# Patient Record
Sex: FEMALE | Race: WHITE | Employment: OTHER | URBAN - METROPOLITAN AREA
[De-identification: names, ages, dates, MRNs, and addresses within clinical notes are randomized per-mention and may not be internally consistent; named-entity substitution may affect disease eponyms.]

---

## 2017-02-24 ENCOUNTER — HOSPITAL ENCOUNTER (OUTPATIENT)
Dept: LAB | Age: 72
Discharge: HOME | End: 2017-02-24

## 2017-02-24 LAB
DIFFERENTIAL METHOD BLD: ABNORMAL
ERYTHROCYTE [DISTWIDTH] IN BLOOD BY AUTOMATED COUNT: 15.4 % (ref 11.9–14.6)
HCT VFR BLD AUTO: 30.9 % (ref 35.8–46.3)
HGB BLD-MCNC: 10.4 G/DL (ref 11.7–15.4)
LYMPHOCYTES # BLD: 3 K/UL (ref 0.5–4.6)
LYMPHOCYTES NFR BLD MANUAL: 18 % (ref 16–44)
MCH RBC QN AUTO: 32.1 PG (ref 26.1–32.9)
MCHC RBC AUTO-ENTMCNC: 33.7 G/DL (ref 31.4–35)
MCV RBC AUTO: 95.4 FL (ref 79.6–97.8)
METAMYELOCYTES NFR BLD MANUAL: 5 %
MONOCYTES # BLD: 0.3 K/UL (ref 0.1–1.3)
MONOCYTES NFR BLD MANUAL: 2 % (ref 3–9)
MYELOCYTES NFR BLD MANUAL: 4 %
NEUTS BAND NFR BLD MANUAL: 6 % (ref 0–10)
NEUTS SEG # BLD: 13.6 K/UL (ref 1.7–8.2)
NEUTS SEG NFR BLD MANUAL: 63 % (ref 47–75)
NRBC BLD-RTO: 2 PER 100 WBC
PLATELET # BLD AUTO: 325 K/UL (ref 150–450)
PLATELET COMMENTS,PCOM: ADEQUATE
PMV BLD AUTO: 9 FL (ref 10.8–14.1)
PROMYELOCYTES NFR BLD MANUAL: 2 %
RBC # BLD AUTO: 3.24 M/UL (ref 4.05–5.25)
RBC MORPH BLD: ABNORMAL
RBC MORPH BLD: ABNORMAL
WBC # BLD AUTO: 16.9 K/UL (ref 4.3–11.1)
WBC MORPH BLD: ABNORMAL

## 2017-02-24 PROCEDURE — 85025 COMPLETE CBC W/AUTO DIFF WBC: CPT | Performed by: FAMILY MEDICINE

## 2017-02-27 ENCOUNTER — HOSPITAL ENCOUNTER (OUTPATIENT)
Dept: LAB | Age: 72
Discharge: HOME | End: 2017-02-27

## 2017-02-27 LAB
APPEARANCE UR: ABNORMAL
BACTERIA URNS QL MICRO: 0 /HPF
BASOPHILS # BLD AUTO: 0 K/UL (ref 0–0.2)
BASOPHILS # BLD: 0 % (ref 0–2)
BILIRUB UR QL: NEGATIVE
CASTS URNS QL MICRO: ABNORMAL /LPF
COLOR UR: YELLOW
DIFFERENTIAL METHOD BLD: ABNORMAL
EOSINOPHIL # BLD: 0.3 K/UL (ref 0–0.8)
EOSINOPHIL NFR BLD: 3 % (ref 0.5–7.8)
EPI CELLS #/AREA URNS HPF: ABNORMAL /HPF
ERYTHROCYTE [DISTWIDTH] IN BLOOD BY AUTOMATED COUNT: 15.3 % (ref 11.9–14.6)
GLUCOSE UR STRIP.AUTO-MCNC: NEGATIVE MG/DL
HCT VFR BLD AUTO: 28.7 % (ref 35.8–46.3)
HGB BLD-MCNC: 9.5 G/DL (ref 11.7–15.4)
HGB UR QL STRIP: NEGATIVE
IMM GRANULOCYTES # BLD: 0.2 K/UL (ref 0–0.5)
IMM GRANULOCYTES NFR BLD AUTO: 2.3 % (ref 0–5)
KETONES UR QL STRIP.AUTO: NEGATIVE MG/DL
LEUKOCYTE ESTERASE UR QL STRIP.AUTO: ABNORMAL
LYMPHOCYTES # BLD AUTO: 27 % (ref 13–44)
LYMPHOCYTES # BLD: 2.5 K/UL (ref 0.5–4.6)
MCH RBC QN AUTO: 31.6 PG (ref 26.1–32.9)
MCHC RBC AUTO-ENTMCNC: 33.1 G/DL (ref 31.4–35)
MCV RBC AUTO: 95.3 FL (ref 79.6–97.8)
MONOCYTES # BLD: 0.7 K/UL (ref 0.1–1.3)
MONOCYTES NFR BLD AUTO: 8 % (ref 4–12)
NEUTS SEG # BLD: 5.5 K/UL (ref 1.7–8.2)
NEUTS SEG NFR BLD AUTO: 60 % (ref 43–78)
NITRITE UR QL STRIP.AUTO: NEGATIVE
PH UR STRIP: 7 [PH] (ref 5–9)
PLATELET # BLD AUTO: 335 K/UL (ref 150–450)
PMV BLD AUTO: 9.2 FL (ref 10.8–14.1)
PROT UR STRIP-MCNC: NEGATIVE MG/DL
RBC # BLD AUTO: 3.01 M/UL (ref 4.05–5.25)
RBC #/AREA URNS HPF: ABNORMAL /HPF
SP GR UR REFRACTOMETRY: 1.02 (ref 1–1.02)
UROBILINOGEN UR QL STRIP.AUTO: 1 EU/DL (ref 0.2–1)
WBC # BLD AUTO: 9.3 K/UL (ref 4.3–11.1)
WBC URNS QL MICRO: ABNORMAL /HPF

## 2017-02-27 PROCEDURE — 81001 URINALYSIS AUTO W/SCOPE: CPT | Performed by: FAMILY MEDICINE

## 2017-02-27 PROCEDURE — 85025 COMPLETE CBC W/AUTO DIFF WBC: CPT | Performed by: FAMILY MEDICINE

## 2018-05-14 ENCOUNTER — HOSPITAL ENCOUNTER (OUTPATIENT)
Dept: PHYSICAL THERAPY | Age: 73
Discharge: HOME | End: 2018-05-14
Payer: MEDICARE

## 2018-05-14 PROCEDURE — 97162 PT EVAL MOD COMPLEX 30 MIN: CPT

## 2018-05-14 PROCEDURE — G8979 MOBILITY GOAL STATUS: HCPCS

## 2018-05-14 PROCEDURE — G8978 MOBILITY CURRENT STATUS: HCPCS

## 2018-05-14 PROCEDURE — 97112 NEUROMUSCULAR REEDUCATION: CPT

## 2018-05-14 NOTE — PROGRESS NOTES
Ambulatory/Rehab Services H2 Model Falls Risk Assessment    Risk Factor Pts. ·   Confusion/Disorientation/Impulsivity  []    4 ·   Symptomatic Depression  [x]   2 ·   Altered Elimination  []   1 ·   Dizziness/Vertigo  [x]   1 ·   Gender (Male)  []   1 ·   Any administered antiepileptics (anticonvulsants):  [x]   2 ·   Any administered benzodiazepines:  []   1 ·   Visual Impairment (specify):  []   1 ·   Portable Oxygen Use  []   1 ·   Orthostatic ? BP  []   1 ·   History of Recent Falls (within 3 mos.)  []   5     Ability to Rise from Chair (choose one) Pts. ·   Ability to rise in a single movement  []   0 ·   Pushes up, successful in one attempt  [x]   1 ·   Multiple attempts, but successful  []   3 ·   Unable to rise without assistance  []   4   Total: (5 or greater = High Risk) 6     Falls Prevention Plan:   []                Physical Limitations to Exercise (specify):   []                Mobility Assistance Device (type):   [x]                Exercise/Equipment Adaptation (specify):perform SLS and other balance exercises  ©2010 [facility] [employer]. All Rights Reserved. [facility] Patent #[id number].  Federal Law prohibits the replication, distribution or use without written permission from [facility] [employer]

## 2018-05-15 NOTE — THERAPY EVALUATION
Jessica Dias  : 1945  [place] / Plan[place] / Product Type: Medicare /  [address]  at [facility]  [address] [place] [address], [facility] [address]  [phone]   [phone]         OUTPATIENT PHYSICAL THERAPY:Initial Assessment 2018    ICD-10: Treatment Diagnosis: difficulty walking R 26.2, muscle weakness  Unstable balance R 26.89    Precautions/Allergies:   Lactose   Fall Risk Score: 6 (? 5 = High Risk)  MD Orders: Eval and treat MEDICAL/REFERRING DIAGNOSIS:  Unstable balance [R26.89]  Weakness [R53.1]   DATE OF ONSET: at least a year or two  REFERRING PHYSICIAN: Roslyn Worthy MD  RETURN PHYSICIAN APPOINTMENT: as needed     INITIAL ASSESSMENT:  Ms. Kesha Shay presents with decreased balance and poor proprioceptive control. She reports she has had this problem for 1-2 years, but it has worsened. She is concerned about falling and reports she must look consistently at the floor in order to walk without falling or running into objects. She reports she often runs into objects in her home or while out. She has limited her time away from home due to fear of falling and lack of balance and coordination. She is not using an assistive device, but does have a cane and walker from prior knee replacement surgery. She is concerned as she has a trip in October to [place] and she will be walking on uneven surfaces. She has trekking poles that she would like to use, but feels uncoordinated with them. She reported her last fall was 2 years ago and she was evaluated for possible TIA/CVA  No signs were noted on imaging. Pt does have some resting tremors due to medication and she has a rather flat affect. She has not been referred to neurology. She denies any headaches, nausea, vomiting or vertigo. She does report turning her head to the right makes her feel as though she will fall.   She did have carotid testing 2 years ago with less than 50% occlusion. PROBLEM LIST (Impacting functional limitations):  1. Decreased Strength  2. Decreased ADL/Functional Activities  3. Decreased Ambulation Ability/Technique  4. Decreased Balance  5. Decreased Flexibility/Joint Mobility INTERVENTIONS PLANNED:  1. Balance Exercise  2. Gait Training  3. Home Exercise Program (HEP)  4. Manual Therapy  5. Range of Motion (ROM)  6. Therapeutic Exercise/Strengthening   TREATMENT PLAN:  Effective Dates: 5/14/2018 TO 8/12/2018 (90 days). Frequency/Duration: 2 times a week for 90 Days    GOALS: (Goals have been discussed and agreed upon with patient.)  Short-Term Functional Goals: Time Frame: 6 weeks  1. Establish independent HEP with no cueing. 2. Pt will be able to report zero falls. 3. Pt will be able to gain 1/2 grade increase in strength on left quads and HS in order to climb stairs with more ease. Discharge Goals: Time Frame: 12 weeks  1. Improve score on Perez balance by at least 5 points to show improvements in balance and to decrease her overall fall risk. 2. Pt will be able to stand with eyes closed for 5- 10 seconds with CGA to SBA. 3. Pt will be able to perform tandem stance with SBA for 10 seconds. 4. Pt will be able to walk on uneven terrain with use of trekking poles and demonstrate good dynamic balance. Rehabilitation Potential For Stated Goals: [facility], I certify that the treatment plan above will be carried out by a therapist or under their direction. Thank you for this referral,  Jayson Mallory, PT     Referring Physician Signature: Alexander Holloway MD              Date                    The information in this section was collected on 5/14/18 (except where otherwise noted). HISTORY:   History of Present Injury/Illness (Reason for Referral): INITIAL ASSESSMENT:  Ms. Niranjan Harris presents with decreased balance and poor proprioceptive control. She reports she has had this problem for 1-2 years, but it has worsened.  She is concerned about falling and reports she must look consistently at the floor in order to walk without falling or running into objects. She reports she often runs into objects in her home or while out. She has limited her time away from home due to fear of falling and lack of balance and coordination. She is not using an assistive device, but does have a cane and walker from prior knee replacement surgery. She is concerned as she has a trip in October to [place] and she will be walking on uneven surfaces. She has trekking poles that she would like to use, but feels uncoordinated with them. She reported her last fall was 2 years ago and she was evaluated for possible TIA/CVA  No signs were noted on imaging. Pt does have some resting tremors due to medication and she has a rather flat affect. She has not been referred to neurology. She denies any headaches, nausea, vomiting or vertigo. She does report turning her head to the right makes her feel as though she will fall. She did have carotid testing 2 years ago with less than 50% occlusion. Past Medical History/Comorbidities:   Ms. Diana Lee  has a past medical history of Arthritis; Autoimmune disease ([address] and Psychiatric disorder. She also has no past medical history of Aneurysm ([address] Arrhythmia; Asthma; CAD (coronary artery disease); Cancer ([address] Chronic kidney disease; Chronic obstructive pulmonary disease ([address] Coagulation disorder ([address] Diabetes ([address] Difficult intubation; GERD (gastroesophageal reflux disease); Heart failure ([address] Hypertension; Liver disease; Malignant hyperthermia due to anesthesia; Morbid obesity ([address] Nausea & vomiting; Pseudocholinesterase deficiency; PUD (peptic ulcer disease); Seizures ([address] Stroke [facility]); Thromboembolus ([address] Thyroid disease; or Unspecified sleep apnea.   Ms. Diana Lee  has a past surgical history that includes hx hysterectomy; hx tonsillectomy; hx adenoidectomy; hx knee arthroscopy (2013); hx breast biopsy (Left); and hx colostomy. Social History/Living Environment:     pt lives with spouse in a 2 level home. She reports more difficulty with descending the stairs than ascending them  Prior Level of Function/Work/Activity:  Pt is a retired . Retired in 2011  Dominant Side:         RIGHT  Current Medications:       Current Outpatient Prescriptions:     HYDROCODONE/ACETAMINOPHEN (NORCO PO), Take  by mouth., Disp: , Rfl:     colestipol (COLESTID) 1 gram tablet, Take 1 g by mouth two (2) times a day., Disp: , Rfl:     Venlafaxine 150 mg tr24, Take 1 Tab by mouth daily. , Disp: , Rfl:     divalproex DR (DEPAKOTE) 500 mg tablet, Take 500 mg by mouth daily. Per anesthesia protocol:instructed to take am of surgery. Indications: MIXED BIPOLAR I DISORDER, Disp: , Rfl:     ziprasidone (GEODON) 20 mg capsule, Take 20 mg by mouth three (3) times daily. Takes 2 capsules at bedtime per pt. Indications: MIXED BIPOLAR I DISORDER, Disp: , Rfl:     zolpidem (AMBIEN) 10 mg tablet, Take 10 mg by mouth nightly as needed. , Disp: , Rfl:    Date Last Reviewed:  5/14/2018     Number of Personal Factors/Comorbidities that affect the Plan of Care: 1-2: MODERATE COMPLEXITY   EXAMINATION:   Observation/Orthostatic Postural Assessment:          Pt with slight ataxic tendencies towards her gait. Very startled righting reactions with eyes closed testing as well as any head movements to the right  ROM:     ROM WFL                                       Strength:     R hip flex 5/5, quad 5/5, HS 5/5, glut med 4/5, ankle PF/DF 5/5         L hip flex 4/5, quad 4/5, HS 4/5, glut med 4+/5, ankle PF/DF 5/5            Special Tests: na  Neurological Screen: pt exhibits decreased coordination and proprioceptive control  Balance:  poor   Body Structures Involved:  1. Eyes and Ears  2. Bones  3. Joints  4. Muscles  5. Ligaments Body Functions Affected:  1. Neuromusculoskeletal  2.  Movement Related Activities and Participation Affected:  1. General Tasks and Demands  2. Mobility  3. Self Care  4. Domestic Life  5. Interpersonal Interactions and Relationships  6. Community, Social and [place]   Number of elements (examined above) that affect the Plan of Care: 3: MODERATE COMPLEXITY   CLINICAL PRESENTATION:   Presentation: Evolving clinical presentation with changing clinical characteristics: MODERATE COMPLEXITY   CLINICAL DECISION MAKING:   Outcome Measure: Tool Used: Perez Balance Scale  Score:  Initial: 36/56 Most Recent: X/56 (Date: -- )   Interpretation of Score: Each section is scored on a 0-4 scale, 0 representing the patients inability to perform the task and 4 representing independence. The scores of each section are added together for a total score of 56. The higher the patients score, the more independent the patient is. Any score below 45 indicates increased risk for falls. Score 56 55-45 44-34 33-23 22-12 11-1 0   Modifier CH CI CJ CK CL CM CN     ? Mobility - Walking and Moving Around:     - CURRENT STATUS: CJ - 20%-39% impaired, limited or restricted    - GOAL STATUS: CI - 1%-19% impaired, limited or restricted    - D/C STATUS:  ---------------To be determined---------------    Medical Necessity:   · Patient is expected to demonstrate progress in strength, balance and coordination to improve safety during ambulation and all transfers. Reason for Services/Other Comments:  · Patient continues to require skilled intervention due to decreased balance, coordination and proprioceptive control that has limited her ability to get ouf of the house due to fear of falling. In addition, pt is considered to be a high risk for falls.    Use of outcome tool(s) and clinical judgement create a POC that gives a: Questionable prediction of patient's progress: MODERATE COMPLEXITY               (In addition to Assessment/Re-Assessment sessions the following treatments were rendered)  Pre-treatment Symptoms/Complaints: Pt reporting no pain. She has a rather flat affect and poor balance with turning to the right, turning her head to the right or even static standing with eyes closed. Pain: Initial:   Pain Intensity 1: 0  Post Session:  0/10     THERAPEUTIC EXERCISE: (5 minutes):  Exercises per grid below to improve mobility and strength. Required minimal verbal cues to promote proper body mechanics. Progressed resistance, range and repetitions as indicated. NEUROMUSCULAR RE-EDUCATION: (15 minutes):  Exercise/activities per grid below to improve balance and coordination. Required minimal verbal and tactile cues to promote static and dynamic balance in standing. Ther ex:   LAQ x 20 bilaterally  Sit to stand x 5  Seated swiss ball with upright posture x 2 min  Seated on swiss ball with trunk rotation to the left x 10 4# weight    Neuromuscular re-education:  SLS bilaterally x 5 sec  airex marching x 20  airex tandem stance bilaterally x 20 sec  Evaluation (  xx   ):    Manual Therapy (   na   ):     Therapeutic Modalities:na    HEP: As above; handouts given to patient for all exercises. Bridging, LAQ      Treatment/Session Assessment:    · Response to Treatment:  Pt exhibiting poor balane, lack of coordination as well as poor proprioceptive control. She had a tendency to fall backwards with SLS and with dynamic standing and her eyes closed. She has increased difficulty with eyes closed as well as any hear or body turning to the right. She reports she must maintain eye contact with the ground to avoid falling. She has not had any recent falls, but reports some near misses. She would like to improve her balance and overall safety with ambulation. · Compliance with Program/Exercises: Will assess as treatment progresses. · Recommendations/Intent for next treatment session: \"Next visit will focus on advancements to more challenging activities\".   Total Treatment Duration: 60 min  PT Patient Time In/Time Out  Time In: 1200  Time Out: 0100  Treatment number 200 High Laura Jay, PT

## 2018-05-17 ENCOUNTER — HOSPITAL ENCOUNTER (OUTPATIENT)
Dept: PHYSICAL THERAPY | Age: 73
Discharge: HOME | End: 2018-05-17
Payer: MEDICARE

## 2018-05-17 PROCEDURE — 97112 NEUROMUSCULAR REEDUCATION: CPT

## 2018-05-17 PROCEDURE — 97110 THERAPEUTIC EXERCISES: CPT

## 2018-05-17 NOTE — PROGRESS NOTES
Albin Hoang  : 1945  [place] / [place] / Product Type: Medicare /  [address]  at [facility]  [address], [facility] [address]  [phone]   [phone]         OUTPATIENT PHYSICAL THERAPY:Daily Note 2018    ICD-10: Treatment Diagnosis: difficulty walking R 26.2, muscle weakness  Unstable balance R 26.89    Precautions/Allergies:   Lactose   Fall Risk Score: 6 (? 5 = High Risk)  MD Orders: Eval and treat MEDICAL/REFERRING DIAGNOSIS:  Unstable balance [R26.89]  Weakness [R53.1]   DATE OF ONSET: at least a year or two  REFERRING PHYSICIAN: Karla Quiroga MD  RETURN PHYSICIAN APPOINTMENT: as needed     INITIAL ASSESSMENT:  Ms. Clark Hernandez presents with decreased balance and poor proprioceptive control. She reports she has had this problem for 1-2 years, but it has worsened. She is concerned about falling and reports she must look consistently at the floor in order to walk without falling or running into objects. She reports she often runs into objects in her home or while out. She has limited her time away from home due to fear of falling and lack of balance and coordination. She is not using an assistive device, but does have a cane and walker from prior knee replacement surgery. She is concerned as she has a trip in October to [place] and she will be walking on uneven surfaces. She has trekking poles that she would like to use, but feels uncoordinated with them. She reported her last fall was 2 years ago and she was evaluated for possible TIA/CVA  No signs were noted on imaging. Pt does have some resting tremors due to medication and she has a rather flat affect. She has not been referred to neurology. She denies any headaches, nausea, vomiting or vertigo. She does report turning her head to the right makes her feel as though she will fall. She did have carotid testing 2 years ago with less than 50% occlusion. PROBLEM LIST (Impacting functional limitations):  1. Decreased Strength  2. Decreased ADL/Functional Activities  3. Decreased Ambulation Ability/Technique  4. Decreased Balance  5. Decreased Flexibility/Joint Mobility INTERVENTIONS PLANNED:  1. Balance Exercise  2. Gait Training  3. Home Exercise Program (HEP)  4. Manual Therapy  5. Range of Motion (ROM)  6. Therapeutic Exercise/Strengthening   TREATMENT PLAN:  Effective Dates: 5/14/2018 TO 8/12/2018 (90 days). Frequency/Duration: 2 times a week for 90 Days    GOALS: (Goals have been discussed and agreed upon with patient.)  Short-Term Functional Goals: Time Frame: 6 weeks  1. Establish independent HEP with no cueing. 2. Pt will be able to report zero falls. 3. Pt will be able to gain 1/2 grade increase in strength on left quads and HS in order to climb stairs with more ease. Discharge Goals: Time Frame: 12 weeks  1. Improve score on Perez balance by at least 5 points to show improvements in balance and to decrease her overall fall risk. 2. Pt will be able to stand with eyes closed for 5- 10 seconds with CGA to SBA. 3. Pt will be able to perform tandem stance with SBA for 10 seconds. 4. Pt will be able to walk on uneven terrain with use of trekking poles and demonstrate good dynamic balance. Rehabilitation Potential For Stated Goals: [address] [facility] therapy, I certify that the treatment plan above will be carried out by a therapist or under their direction. Thank you for this referral,  Saran Zamarripa PT                 The information in this section was collected on 5/14/18 (except where otherwise noted). HISTORY:   History of Present Injury/Illness (Reason for Referral): INITIAL ASSESSMENT:  Ms. Deb Barba presents with decreased balance and poor proprioceptive control. She reports she has had this problem for 1-2 years, but it has worsened.  She is concerned about falling and reports she must look consistently at the floor in order to walk without falling or running into objects. She reports she often runs into objects in her home or while out. She has limited her time away from home due to fear of falling and lack of balance and coordination. She is not using an assistive device, but does have a cane and walker from prior knee replacement surgery. She is concerned as she has a trip in October to [place] and she will be walking on uneven surfaces. She has trekking poles that she would like to use, but feels uncoordinated with them. She reported her last fall was 2 years ago and she was evaluated for possible TIA/CVA  No signs were noted on imaging. Pt does have some resting tremors due to medication and she has a rather flat affect. She has not been referred to neurology. She denies any headaches, nausea, vomiting or vertigo. She does report turning her head to the right makes her feel as though she will fall. She did have carotid testing 2 years ago with less than 50% occlusion. Past Medical History/Comorbidities:   Ms. Torsten Banerjee  has a past medical history of Arthritis; Autoimmune disease ([address] and Psychiatric disorder. She also has no past medical history of Aneurysm ([address] Arrhythmia; Asthma; CAD (coronary artery disease); Cancer ([address] Chronic kidney disease; Chronic obstructive pulmonary disease ([address] Coagulation disorder ([address] Diabetes ([address] Difficult intubation; GERD (gastroesophageal reflux disease); Heart failure ([address] Hypertension; Liver disease; Malignant hyperthermia due to anesthesia; Morbid obesity ([address] Nausea & vomiting; Pseudocholinesterase deficiency; PUD (peptic ulcer disease); Seizures ([address] Stroke [facility]); Thromboembolus ([address] Thyroid disease; or Unspecified sleep apnea. Ms. Torsten Banerjee  has a past surgical history that includes hx hysterectomy; hx tonsillectomy; hx adenoidectomy; hx knee arthroscopy (2013); hx breast biopsy (Left); and hx colostomy.   Social History/Living Environment:     pt lives with spouse in a 2 level home. She reports more difficulty with descending the stairs than ascending them  Prior Level of Function/Work/Activity:  Pt is a retired . Retired in 2011  Dominant Side:         RIGHT  Current Medications:       Current Outpatient Prescriptions:     HYDROCODONE/ACETAMINOPHEN (NORCO PO), Take  by mouth., Disp: , Rfl:     colestipol (COLESTID) 1 gram tablet, Take 1 g by mouth two (2) times a day., Disp: , Rfl:     Venlafaxine 150 mg tr24, Take 1 Tab by mouth daily. , Disp: , Rfl:     divalproex DR (DEPAKOTE) 500 mg tablet, Take 500 mg by mouth daily. Per anesthesia protocol:instructed to take am of surgery. Indications: MIXED BIPOLAR I DISORDER, Disp: , Rfl:     ziprasidone (GEODON) 20 mg capsule, Take 20 mg by mouth three (3) times daily. Takes 2 capsules at bedtime per pt. Indications: MIXED BIPOLAR I DISORDER, Disp: , Rfl:     zolpidem (AMBIEN) 10 mg tablet, Take 10 mg by mouth nightly as needed. , Disp: , Rfl:    Date Last Reviewed:  5/17/2018     Number of Personal Factors/Comorbidities that affect the Plan of Care: 1-2: MODERATE COMPLEXITY   EXAMINATION:   Observation/Orthostatic Postural Assessment:          Pt with slight ataxic tendencies towards her gait. Very startled righting reactions with eyes closed testing as well as any head movements to the right  ROM:     ROM WFL                                       Strength:     R hip flex 5/5, quad 5/5, HS 5/5, glut med 4/5, ankle PF/DF 5/5         L hip flex 4/5, quad 4/5, HS 4/5, glut med 4+/5, ankle PF/DF 5/5            Special Tests: na  Neurological Screen: pt exhibits decreased coordination and proprioceptive control  Balance:  poor   Body Structures Involved:  1. Eyes and Ears  2. Bones  3. Joints  4. Muscles  5. Ligaments Body Functions Affected:  1. Neuromusculoskeletal  2. Movement Related Activities and Participation Affected:  1. General Tasks and Demands  2. Mobility  3. Self Care  4.  Domestic Life  5. Interpersonal Interactions and Relationships  6. Community, Social and [place]   Number of elements (examined above) that affect the Plan of Care: 3: MODERATE COMPLEXITY   CLINICAL PRESENTATION:   Presentation: Evolving clinical presentation with changing clinical characteristics: MODERATE COMPLEXITY   CLINICAL DECISION MAKING:   Outcome Measure: Tool Used: Perez Balance Scale  Score:  Initial: 36/56 Most Recent: X/56 (Date: -- )   Interpretation of Score: Each section is scored on a 0-4 scale, 0 representing the patients inability to perform the task and 4 representing independence. The scores of each section are added together for a total score of 56. The higher the patients score, the more independent the patient is. Any score below 45 indicates increased risk for falls. Score 56 55-45 44-34 33-23 22-12 11-1 0   Modifier CH CI CJ CK CL CM CN     ? Mobility - Walking and Moving Around:     - CURRENT STATUS: CJ - 20%-39% impaired, limited or restricted    - GOAL STATUS: CI - 1%-19% impaired, limited or restricted    - D/C STATUS:  ---------------To be determined---------------    Medical Necessity:   · Patient is expected to demonstrate progress in strength, balance and coordination to improve safety during ambulation and all transfers. Reason for Services/Other Comments:  · Patient continues to require skilled intervention due to decreased balance, coordination and proprioceptive control that has limited her ability to get ouf of the house due to fear of falling. In addition, pt is considered to be a high risk for falls. Use of outcome tool(s) and clinical judgement create a POC that gives a: Questionable prediction of patient's progress: MODERATE COMPLEXITY               (In addition to Assessment/Re-Assessment sessions the following treatments were rendered)  Pre-treatment Symptoms/Complaints:  Pt reporting no pain.   She was confused about appointment time today, but was able to be worked into schedule. Pain: Initial:   Pain Intensity 1: 0  Post Session:  0/10     THERAPEUTIC EXERCISE: (20 minutes):  Exercises per grid below to improve mobility and strength. Required minimal verbal cues to promote proper body mechanics. Progressed resistance, range and repetitions as indicated. NEUROMUSCULAR RE-EDUCATION: (25 minutes):  Exercise/activities per grid below to improve balance and coordination. Required minimal verbal and tactile cues to promote dynamic balance in standing. Ther ex:   LAQ x 20 bilaterally-held  Sit to stand x 5  Seated swiss ball with upright posture x 2 min -held  Seated on swiss ball with trunk rotation to the left x 10 4# weight-held  Nu-step level 1 x 7 min  Ambulation with trekking poles x 65 feet and slight verbal cuing  Bilateral hip abduction 25# x 10  HS curls 20# x 20  Neuromuscular re-education:  SLS bilaterally x 10 sec  airex marching x 30  airex tandem stance bilaterally x 20 sec-held  rhomberg stance on floor with bilateral head turns  Cone touches bilaterally with emphasis on eyes looking upwards or ahead of her  Turning to the right and taking a few steps x 10    Manual Therapy (   na   ):     Therapeutic Modalities:na    HEP: continue as directed      Treatment/Session Assessment:    · Response to Treatment:  Pt reporting no falls or near misses since her last treatment. She progressed well with therapy today and practiced various standing positions with head turns and variations in body position. She did require CGA and use of handrails at times to avoid losing her balance. She also ambulated with her trekking poles that she plans on using on her trip to [place]. Pt required some verbal cuing, but overall progressed well with them. Slight cuing for 4 point gait and to decrease her step length. · Compliance with Program/Exercises: Will assess as treatment progresses. · Recommendations/Intent for next treatment session:  \"Next visit will focus on advancements to more challenging activities\".   Total Treatment Duration: 45 min  PT Patient Time In/Time Out  Time In: 0115  Time Out: 0200  Treatment number 2  Shannan Montalvo, PT

## 2018-05-23 ENCOUNTER — HOSPITAL ENCOUNTER (OUTPATIENT)
Dept: PHYSICAL THERAPY | Age: 73
Discharge: HOME | End: 2018-05-23
Payer: MEDICARE

## 2018-05-23 PROCEDURE — 97112 NEUROMUSCULAR REEDUCATION: CPT

## 2018-05-23 PROCEDURE — 97110 THERAPEUTIC EXERCISES: CPT

## 2018-05-23 NOTE — PROGRESS NOTES
Aleksandra Ma  : 1945  [place] / [place] / Product Type: Medicare /  [address]  at [address], [facility] [address]  [phone]   [phone]         OUTPATIENT PHYSICAL THERAPY:Daily Note 2018    ICD-10: Treatment Diagnosis: difficulty walking R 26.2, muscle weakness  Unstable balance R 26.89    Precautions/Allergies:   Lactose   Fall Risk Score: 6 (? 5 = High Risk)  MD Orders: Eval and treat MEDICAL/REFERRING DIAGNOSIS:  Unstable balance [R26.89]  Weakness [R53.1]   DATE OF ONSET: at least a year or two  REFERRING PHYSICIAN: Ramona Guidry MD  RETURN PHYSICIAN APPOINTMENT: as needed     INITIAL ASSESSMENT:  Ms. Marie Alicia presents with decreased balance and poor proprioceptive control. She reports she has had this problem for 1-2 years, but it has worsened. She is concerned about falling and reports she must look consistently at the floor in order to walk without falling or running into objects. She reports she often runs into objects in her home or while out. She has limited her time away from home due to fear of falling and lack of balance and coordination. She is not using an assistive device, but does have a cane and walker from prior knee replacement surgery. She is concerned as she has a trip in October to [place] and she will be walking on uneven surfaces. She has trekking poles that she would like to use, but feels uncoordinated with them. She reported her last fall was 2 years ago and she was evaluated for possible TIA/CVA  No signs were noted on imaging. Pt does have some resting tremors due to medication and she has a rather flat affect. She has not been referred to neurology. She denies any headaches, nausea, vomiting or vertigo. She does report turning her head to the right makes her feel as though she will fall. She did have carotid testing 2 years ago with less than 50% occlusion. PROBLEM LIST (Impacting functional limitations):  1. Decreased Strength  2. Decreased ADL/Functional Activities  3. Decreased Ambulation Ability/Technique  4. Decreased Balance  5. Decreased Flexibility/Joint Mobility INTERVENTIONS PLANNED:  1. Balance Exercise  2. Gait Training  3. Home Exercise Program (HEP)  4. Manual Therapy  5. Range of Motion (ROM)  6. Therapeutic Exercise/Strengthening   TREATMENT PLAN:  Effective Dates: 5/14/2018 TO 8/12/2018 (90 days). Frequency/Duration: 2 times a week for 90 Days    GOALS: (Goals have been discussed and agreed upon with patient.)  Short-Term Functional Goals: Time Frame: 6 weeks  1. Establish independent HEP with no cueing. 2. Pt will be able to report zero falls. 3. Pt will be able to gain 1/2 grade increase in strength on left quads and HS in order to climb stairs with more ease. Discharge Goals: Time Frame: 12 weeks  1. Improve score on Perez balance by at least 5 points to show improvements in balance and to decrease her overall fall risk. 2. Pt will be able to stand with eyes closed for 5- 10 seconds with CGA to SBA. 3. Pt will be able to perform tandem stance with SBA for 10 seconds. 4. Pt will be able to walk on uneven terrain with use of trekking poles and demonstrate good dynamic balance. Rehabilitation Potential For Stated Goals: [facility] therapy, I certify that the treatment plan above will be carried out by a therapist or under their direction. Thank you for this referral,  Rambo Das, PT                 The information in this section was collected on 5/14/18 (except where otherwise noted). HISTORY:   History of Present Injury/Illness (Reason for Referral): INITIAL ASSESSMENT:  Ms. Chay Edwards presents with decreased balance and poor proprioceptive control. She reports she has had this problem for 1-2 years, but it has worsened.  She is concerned about falling and reports she must look consistently at the floor in order to walk without falling or running into objects. She reports she often runs into objects in her home or while out. She has limited her time away from home due to fear of falling and lack of balance and coordination. She is not using an assistive device, but does have a cane and walker from prior knee replacement surgery. She is concerned as she has a trip in October to [place] and she will be walking on uneven surfaces. She has trekking poles that she would like to use, but feels uncoordinated with them. She reported her last fall was 2 years ago and she was evaluated for possible TIA/CVA  No signs were noted on imaging. Pt does have some resting tremors due to medication and she has a rather flat affect. She has not been referred to neurology. She denies any headaches, nausea, vomiting or vertigo. She does report turning her head to the right makes her feel as though she will fall. She did have carotid testing 2 years ago with less than 50% occlusion. Past Medical History/Comorbidities:   Ms. Ashley Robledo  has a past medical history of Arthritis; Autoimmune disease ([address] and Psychiatric disorder. She also has no past medical history of Aneurysm ([address] Arrhythmia; Asthma; CAD (coronary artery disease); Cancer ([address] Chronic kidney disease; Chronic obstructive pulmonary disease ([address] Coagulation disorder ([address] Diabetes ([address] Difficult intubation; GERD (gastroesophageal reflux disease); Heart failure ([address] Hypertension; Liver disease; Malignant hyperthermia due to anesthesia; Morbid obesity ([address] Nausea & vomiting; Pseudocholinesterase deficiency; PUD (peptic ulcer disease); Seizures ([address] Stroke [facility]); Thromboembolus ([address] Thyroid disease; or Unspecified sleep apnea. Ms. Ashley Robledo  has a past surgical history that includes hx hysterectomy; hx tonsillectomy; hx adenoidectomy; hx knee arthroscopy (2013); hx breast biopsy (Left); and hx colostomy.   Social History/Living Environment:     pt lives with spouse in a 2 level home. She reports more difficulty with descending the stairs than ascending them  Prior Level of Function/Work/Activity:  Pt is a retired . Retired in 2011  Dominant Side:         RIGHT  Current Medications:       Current Outpatient Prescriptions:     HYDROCODONE/ACETAMINOPHEN (NORCO PO), Take  by mouth., Disp: , Rfl:     colestipol (COLESTID) 1 gram tablet, Take 1 g by mouth two (2) times a day., Disp: , Rfl:     Venlafaxine 150 mg tr24, Take 1 Tab by mouth daily. , Disp: , Rfl:     divalproex DR (DEPAKOTE) 500 mg tablet, Take 500 mg by mouth daily. Per anesthesia protocol:instructed to take am of surgery. Indications: MIXED BIPOLAR I DISORDER, Disp: , Rfl:     ziprasidone (GEODON) 20 mg capsule, Take 20 mg by mouth three (3) times daily. Takes 2 capsules at bedtime per pt. Indications: MIXED BIPOLAR I DISORDER, Disp: , Rfl:     zolpidem (AMBIEN) 10 mg tablet, Take 10 mg by mouth nightly as needed. , Disp: , Rfl:    Date Last Reviewed:  5/23/2018     Number of Personal Factors/Comorbidities that affect the Plan of Care: 1-2: MODERATE COMPLEXITY   EXAMINATION:   Observation/Orthostatic Postural Assessment:          Pt with slight ataxic tendencies towards her gait. Very startled righting reactions with eyes closed testing as well as any head movements to the right  ROM:     ROM WFL                                       Strength:     R hip flex 5/5, quad 5/5, HS 5/5, glut med 4/5, ankle PF/DF 5/5         L hip flex 4/5, quad 4/5, HS 4/5, glut med 4+/5, ankle PF/DF 5/5            Special Tests: na  Neurological Screen: pt exhibits decreased coordination and proprioceptive control  Balance:  poor   Body Structures Involved:  1. Eyes and Ears  2. Bones  3. Joints  4. Muscles  5. Ligaments Body Functions Affected:  1. Neuromusculoskeletal  2. Movement Related Activities and Participation Affected:  1. General Tasks and Demands  2. Mobility  3. Self Care  4.  Domestic Life  5. Interpersonal Interactions and Relationships  6. Community, Social and [place]   Number of elements (examined above) that affect the Plan of Care: 3: MODERATE COMPLEXITY   CLINICAL PRESENTATION:   Presentation: Evolving clinical presentation with changing clinical characteristics: MODERATE COMPLEXITY   CLINICAL DECISION MAKING:   Outcome Measure: Tool Used: Perez Balance Scale  Score:  Initial: 36/56 Most Recent: X/56 (Date: -- )   Interpretation of Score: Each section is scored on a 0-4 scale, 0 representing the patients inability to perform the task and 4 representing independence. The scores of each section are added together for a total score of 56. The higher the patients score, the more independent the patient is. Any score below 45 indicates increased risk for falls. Score 56 55-45 44-34 33-23 22-12 11-1 0   Modifier CH CI CJ CK CL CM CN     ? Mobility - Walking and Moving Around:     - CURRENT STATUS: CJ - 20%-39% impaired, limited or restricted    - GOAL STATUS: CI - 1%-19% impaired, limited or restricted    - D/C STATUS:  ---------------To be determined---------------    Medical Necessity:   · Patient is expected to demonstrate progress in strength, balance and coordination to improve safety during ambulation and all transfers. Reason for Services/Other Comments:  · Patient continues to require skilled intervention due to decreased balance, coordination and proprioceptive control that has limited her ability to get ouf of the house due to fear of falling. In addition, pt is considered to be a high risk for falls. Use of outcome tool(s) and clinical judgement create a POC that gives a: Questionable prediction of patient's progress: MODERATE COMPLEXITY               (In addition to Assessment/Re-Assessment sessions the following treatments were rendered)  Pre-treatment Symptoms/Complaints:  Pt reporting no pain and no loss of balance since last treatment.     Pain: Initial:   Pain Intensity 1: 0  Post Session:  0/10     THERAPEUTIC EXERCISE: (20 minutes):  Exercises per grid below to improve mobility and strength. Required minimal verbal cues to promote proper body mechanics. Progressed resistance, range and repetitions as indicated. NEUROMUSCULAR RE-EDUCATION: (25 minutes):  Exercise/activities per grid below to improve balance and coordination. Required minimal verbal and tactile cues to promote dynamic balance in standing. Ther ex:   LAQ x 20 bilaterally-held  Sit to stand x 5  Seated swiss ball with upright posture x 2 min -held  Seated on swiss ball with trunk rotation to the left x 10 4# weight-held  Nu-step level 1 x 10 min  Ambulation with trekking poles x 65 feet and slight verbal cuing-held  Bilateral hip abduction 25# x 30  HS curls 20# x 20  Step-ups 6 inch no use of hands x 12  Neuromuscular re-education:  SLS bilaterally x 10 sec  airex marching x 30  airex tandem stance bilaterally x 20 sec-  airex tandem stance with head turns x 10  rhomberg stance on floor with bilateral head turns  [place] touches bilaterally with emphasis on eyes looking upwards or ahead of her-held  [place]  x 1  [place]  x 1  Turning to the right and taking a few steps x 10  [place] weaving and forward and backwards steps with 4# medicine ball one time to each side    Manual Therapy (   na   ):     Therapeutic Modalities:na    HEP: continue as directed      Treatment/Session Assessment:    · Response to Treatment:  Pt reporting no complaints. She progressed well today with turning more to the right and performing sudden and quick turns to the right with ambulation and minor loss of balance at times with patient able to recover. She seems rather erratic at times in her balance and has most difficulty with looking around while ambulating. She is not getting out of her house much due to her own choice no so much due to lack of mobility.   Encouraged pt to move as much as she can in her home safely. · Compliance with Program/Exercises: Will assess as treatment progresses. · Recommendations/Intent for next treatment session: \"Next visit will focus on advancements to more challenging activities\".   Total Treatment Duration: 45 min  PT Patient Time In/Time Out  Time In: 1130  Time Out: 1215  Treatment number [id number] Navi Burris PT

## 2018-05-29 ENCOUNTER — HOSPITAL ENCOUNTER (OUTPATIENT)
Dept: PHYSICAL THERAPY | Age: 73
Discharge: HOME | End: 2018-05-29
Payer: MEDICARE

## 2018-05-29 PROCEDURE — 97110 THERAPEUTIC EXERCISES: CPT

## 2018-05-29 PROCEDURE — 97112 NEUROMUSCULAR REEDUCATION: CPT

## 2018-05-29 NOTE — PROGRESS NOTES
Enrike Conteh  : 1945  [place] / [place] / Product Type: Medicare /  [address]  at [facility]  [address], [address]  [phone]   [phone]         OUTPATIENT PHYSICAL THERAPY:Daily Note 2018    ICD-10: Treatment Diagnosis: difficulty walking R 26.2, muscle weakness  Unstable balance R 26.89    Precautions/Allergies:   Lactose   Fall Risk Score: 6 (? 5 = High Risk)  MD Orders: Eval and treat MEDICAL/REFERRING DIAGNOSIS:  Unstable balance [R26.89]  Weakness [R53.1]   DATE OF ONSET: at least a year or two  REFERRING PHYSICIAN: Tomas Jha MD  RETURN PHYSICIAN APPOINTMENT: as needed     INITIAL ASSESSMENT:  Ms. Kyle Hearn presents with decreased balance and poor proprioceptive control. She reports she has had this problem for 1-2 years, but it has worsened. She is concerned about falling and reports she must look consistently at the floor in order to walk without falling or running into objects. She reports she often runs into objects in her home or while out. She has limited her time away from home due to fear of falling and lack of balance and coordination. She is not using an assistive device, but does have a cane and walker from prior knee replacement surgery. She is concerned as she has a trip in October to [place] and she will be walking on uneven surfaces. She has trekking poles that she would like to use, but feels uncoordinated with them. She reported her last fall was 2 years ago and she was evaluated for possible TIA/CVA  No signs were noted on imaging. Pt does have some resting tremors due to medication and she has a rather flat affect. She has not been referred to neurology. She denies any headaches, nausea, vomiting or vertigo. She does report turning her head to the right makes her feel as though she will fall. She did have carotid testing 2 years ago with less than 50% occlusion. PROBLEM LIST (Impacting functional limitations):  1. Decreased Strength  2. Decreased ADL/Functional Activities  3. Decreased Ambulation Ability/Technique  4. Decreased Balance  5. Decreased Flexibility/Joint Mobility INTERVENTIONS PLANNED:  1. Balance Exercise  2. Gait Training  3. Home Exercise Program (HEP)  4. Manual Therapy  5. Range of Motion (ROM)  6. Therapeutic Exercise/Strengthening   TREATMENT PLAN:  Effective Dates: 5/14/2018 TO 8/12/2018 (90 days). Frequency/Duration: 2 times a week for 90 Days    GOALS: (Goals have been discussed and agreed upon with patient.)  Short-Term Functional Goals: Time Frame: 6 weeks  1. Establish independent HEP with no cueing. 2. Pt will be able to report zero falls. 3. Pt will be able to gain 1/2 grade increase in strength on left quads and HS in order to climb stairs with more ease. Discharge Goals: Time Frame: 12 weeks  1. Improve score on Perez balance by at least 5 points to show improvements in balance and to decrease her overall fall risk. 2. Pt will be able to stand with eyes closed for 5- 10 seconds with CGA to SBA. 3. Pt will be able to perform tandem stance with SBA for 10 seconds. 4. Pt will be able to walk on uneven terrain with use of trekking poles and demonstrate good dynamic balance. Rehabilitation Potential For Stated Goals: [id number] therapy, I certify that the treatment plan above will be carried out by a therapist or under their direction. Thank you for this referral,  Olga Briceno, PT                 The information in this section was collected on 5/14/18 (except where otherwise noted). HISTORY:   History of Present Injury/Illness (Reason for Referral): INITIAL ASSESSMENT:  Ms. Clark Hernandez presents with decreased balance and poor proprioceptive control. She reports she has had this problem for 1-2 years, but it has worsened.  She is concerned about falling and reports she must look consistently at the floor in order to walk without falling or running into objects. She reports she often runs into objects in her home or while out. She has limited her time away from home due to fear of falling and lack of balance and coordination. She is not using an assistive device, but does have a cane and walker from prior knee replacement surgery. She is concerned as she has a trip in October to [place] and she will be walking on uneven surfaces. She has trekking poles that she would like to use, but feels uncoordinated with them. She reported her last fall was 2 years ago and she was evaluated for possible TIA/CVA  No signs were noted on imaging. Pt does have some resting tremors due to medication and she has a rather flat affect. She has not been referred to neurology. She denies any headaches, nausea, vomiting or vertigo. She does report turning her head to the right makes her feel as though she will fall. She did have carotid testing 2 years ago with less than 50% occlusion. Past Medical History/Comorbidities:   Ms. Niranjan Harris  has a past medical history of Arthritis; Autoimmune disease ([address] and Psychiatric disorder. She also has no past medical history of Aneurysm ([address] Arrhythmia; Asthma; CAD (coronary artery disease); Cancer ([address] Chronic kidney disease; Chronic obstructive pulmonary disease ([address] Coagulation disorder ([address] Diabetes ([address] Difficult intubation; GERD (gastroesophageal reflux disease); Heart failure ([address] Hypertension; Liver disease; Malignant hyperthermia due to anesthesia; Morbid obesity ([address] Nausea & vomiting; Pseudocholinesterase deficiency; PUD (peptic ulcer disease); Seizures ([address] Stroke [facility]); Thromboembolus ([address] Thyroid disease; or Unspecified sleep apnea. Ms. Niranjan Harris  has a past surgical history that includes hx hysterectomy; hx tonsillectomy; hx adenoidectomy; hx knee arthroscopy (2013); hx breast biopsy (Left); and hx colostomy.   Social History/Living Environment:     pt lives with spouse in a 2 level home. She reports more difficulty with descending the stairs than ascending them  Prior Level of Function/Work/Activity:  Pt is a retired . Retired in 2011  Dominant Side:         RIGHT  Current Medications:       Current Outpatient Prescriptions:     HYDROCODONE/ACETAMINOPHEN (NORCO PO), Take  by mouth., Disp: , Rfl:     colestipol (COLESTID) 1 gram tablet, Take 1 g by mouth two (2) times a day., Disp: , Rfl:     Venlafaxine 150 mg tr24, Take 1 Tab by mouth daily. , Disp: , Rfl:     divalproex DR (DEPAKOTE) 500 mg tablet, Take 500 mg by mouth daily. Per anesthesia protocol:instructed to take am of surgery. Indications: MIXED BIPOLAR I DISORDER, Disp: , Rfl:     ziprasidone (GEODON) 20 mg capsule, Take 20 mg by mouth three (3) times daily. Takes 2 capsules at bedtime per pt. Indications: MIXED BIPOLAR I DISORDER, Disp: , Rfl:     zolpidem (AMBIEN) 10 mg tablet, Take 10 mg by mouth nightly as needed. , Disp: , Rfl:    Date Last Reviewed:  5/29/2018     Number of Personal Factors/Comorbidities that affect the Plan of Care: 1-2: MODERATE COMPLEXITY   EXAMINATION:   Observation/Orthostatic Postural Assessment:          Pt with slight ataxic tendencies towards her gait. Very startled righting reactions with eyes closed testing as well as any head movements to the right  ROM:     ROM WFL                                       Strength:     R hip flex 5/5, quad 5/5, HS 5/5, glut med 4/5, ankle PF/DF 5/5         L hip flex 4/5, quad 4/5, HS 4/5, glut med 4+/5, ankle PF/DF 5/5            Special Tests: na  Neurological Screen: pt exhibits decreased coordination and proprioceptive control  Balance:  poor   Body Structures Involved:  1. Eyes and Ears  2. Bones  3. Joints  4. Muscles  5. Ligaments Body Functions Affected:  1. Neuromusculoskeletal  2. Movement Related Activities and Participation Affected:  1. General Tasks and Demands  2. Mobility  3. Self Care  4.  Domestic Life  5. Interpersonal Interactions and Relationships  6. Community, Social and [place]   Number of elements (examined above) that affect the Plan of Care: 3: MODERATE COMPLEXITY   CLINICAL PRESENTATION:   Presentation: Evolving clinical presentation with changing clinical characteristics: MODERATE COMPLEXITY   CLINICAL DECISION MAKING:   Outcome Measure: Tool Used: Perez Balance Scale  Score:  Initial: 36/56 Most Recent: X/56 (Date: -- )   Interpretation of Score: Each section is scored on a 0-4 scale, 0 representing the patients inability to perform the task and 4 representing independence. The scores of each section are added together for a total score of 56. The higher the patients score, the more independent the patient is. Any score below 45 indicates increased risk for falls. Score 56 55-45 44-34 33-23 22-12 11-1 0   Modifier CH CI CJ CK CL CM CN     ? Mobility - Walking and Moving Around:     - CURRENT STATUS: CJ - 20%-39% impaired, limited or restricted    - GOAL STATUS: CI - 1%-19% impaired, limited or restricted    - D/C STATUS:  ---------------To be determined---------------    Medical Necessity:   · Patient is expected to demonstrate progress in strength, balance and coordination to improve safety during ambulation and all transfers. Reason for Services/Other Comments:  · Patient continues to require skilled intervention due to decreased balance, coordination and proprioceptive control that has limited her ability to get ouf of the house due to fear of falling. In addition, pt is considered to be a high risk for falls. Use of outcome tool(s) and clinical judgement create a POC that gives a: Questionable prediction of patient's progress: MODERATE COMPLEXITY               (In addition to Assessment/Re-Assessment sessions the following treatments were rendered)  Pre-treatment Symptoms/Complaints:  Pt reporting feeling okay.     Pain: Initial:   Pain Intensity 1: 0  Post Session:  0/10     THERAPEUTIC EXERCISE: (20 minutes):  Exercises per grid below to improve mobility and strength. Required minimal verbal cues to promote proper body mechanics. Progressed resistance, range and repetitions as indicated. NEUROMUSCULAR RE-EDUCATION: (25 minutes):  Exercise/activities per grid below to improve balance and coordination. Required minimal verbal and tactile cues to promote dynamic balance in standing. Ther ex:   LAQ x 20 bilaterally-held  Sit to stand x 5  Seated swiss ball with upright posture x 2 min -held  Seated on swiss ball with trunk rotation to the left x 10 4# weight-held  Nu-step level 1 x 10 min  Ambulation with trekking poles x 65 feet and slight verbal cuing-held  Bilateral hip abduction 25# x 30  Bilateral hip extension 25# x 20  HS curls 20# x 20  Step-ups 6 inch no use of hands x 20 -10 without weight and 10 holding 3# dumbbell weight  Neuromuscular re-education:  SLS bilaterally x 20 sec on airex  airex marching x 30-held  airex tandem stance bilaterally x 20 sec-  airex tandem stance with head turns x 10  rhomberg stance on floor with bilateral head turns-held  [place]one touches bilaterally with emphasis on eyes looking upwards or ahead of her-held  [place]  x 1-held  [place]  x 1-held  Turning to the right and taking a few steps x 10  Cone weaving and forward and backwards steps with 4# medicine ball one time to each side-held  Obstacle course working on right and left turns as well as stepping up without handrails 2x 35 feet    Manual Therapy (   na   ):     Therapeutic Modalities:na    HEP: continue as directed      Treatment/Session Assessment:    · Response to Treatment:  Pt reporting she has been able to perform her home program.  She was also able to attend a high school graduation and did not lose her balance. She reports she still has some difficulty with turning to the right and she still relies heavily on her eyes viewing the floor.  She progressed well today and actually felt better with step-ups as she was holding the 3# dumbbelll weight. She did report fatiguing and was ready to stop treatment. Pt will return this week. · Compliance with Program/Exercises: Will assess as treatment progresses. · Recommendations/Intent for next treatment session: \"Next visit will focus on advancements to more challenging activities\".   Total Treatment Duration: 45 min  PT Patient Time In/Time Out  Time In: 1240  Time Out: 0125  Treatment number [id number] [facility], [place]

## 2018-05-31 ENCOUNTER — HOSPITAL ENCOUNTER (OUTPATIENT)
Dept: PHYSICAL THERAPY | Age: 73
Discharge: HOME | End: 2018-05-31
Payer: MEDICARE

## 2018-05-31 PROCEDURE — 97112 NEUROMUSCULAR REEDUCATION: CPT

## 2018-05-31 PROCEDURE — 97110 THERAPEUTIC EXERCISES: CPT

## 2018-06-01 NOTE — PROGRESS NOTES
Nahid Lanza  : 1945  [place] / [place] / Product Type: Medicare /  [address]  at [facility] Therapy  [address], [facility] [address]  [phone]   [phone]         OUTPATIENT PHYSICAL THERAPY:Daily Note 2018    ICD-10: Treatment Diagnosis: difficulty walking R 26.2, muscle weakness  Unstable balance R 26.89    Precautions/Allergies:   Lactose   Fall Risk Score: 6 (? 5 = High Risk)  MD Orders: Eval and treat MEDICAL/REFERRING DIAGNOSIS:  Unstable balance [R26.89]  Weakness [R53.1]   DATE OF ONSET: at least a year or two  REFERRING PHYSICIAN: Alexander Holloway MD  RETURN PHYSICIAN APPOINTMENT: as needed     INITIAL ASSESSMENT:  Ms. Niranjan Harris presents with decreased balance and poor proprioceptive control. She reports she has had this problem for 1-2 years, but it has worsened. She is concerned about falling and reports she must look consistently at the floor in order to walk without falling or running into objects. She reports she often runs into objects in her home or while out. She has limited her time away from home due to fear of falling and lack of balance and coordination. She is not using an assistive device, but does have a cane and walker from prior knee replacement surgery. She is concerned as she has a trip in October to [place] and she will be walking on uneven surfaces. She has trekking poles that she would like to use, but feels uncoordinated with them. She reported her last fall was 2 years ago and she was evaluated for possible TIA/CVA  No signs were noted on imaging. Pt does have some resting tremors due to medication and she has a rather flat affect. She has not been referred to neurology. She denies any headaches, nausea, vomiting or vertigo. She does report turning her head to the right makes her feel as though she will fall. She did have carotid testing 2 years ago with less than 50% occlusion. PROBLEM LIST (Impacting functional limitations):  1. Decreased Strength  2. Decreased ADL/Functional Activities  3. Decreased Ambulation Ability/Technique  4. Decreased Balance  5. Decreased Flexibility/Joint Mobility INTERVENTIONS PLANNED:  1. Balance Exercise  2. Gait Training  3. Home Exercise Program (HEP)  4. Manual Therapy  5. Range of Motion (ROM)  6. Therapeutic Exercise/Strengthening   TREATMENT PLAN:  Effective Dates: 5/14/2018 TO 8/12/2018 (90 days). Frequency/Duration: 2 times a week for 90 Days    GOALS: (Goals have been discussed and agreed upon with patient.)  Short-Term Functional Goals: Time Frame: 6 weeks  1. Establish independent HEP with no cueing. 2. Pt will be able to report zero falls. 3. Pt will be able to gain 1/2 grade increase in strength on left quads and HS in order to climb stairs with more ease. Discharge Goals: Time Frame: 12 weeks  1. Improve score on Perez balance by at least 5 points to show improvements in balance and to decrease her overall fall risk. 2. Pt will be able to stand with eyes closed for 5- 10 seconds with CGA to SBA. 3. Pt will be able to perform tandem stance with SBA for 10 seconds. 4. Pt will be able to walk on uneven terrain with use of trekking poles and demonstrate good dynamic balance. Rehabilitation Potential For Stated Goals: [facility] therapy, I certify that the treatment plan above will be carried out by a therapist or under their direction. Thank you for this referral,  Leeroy Rodriguez, PT                 The information in this section was collected on 5/14/18 (except where otherwise noted). HISTORY:   History of Present Injury/Illness (Reason for Referral): INITIAL ASSESSMENT:  Ms. Alana Rondon presents with decreased balance and poor proprioceptive control. She reports she has had this problem for 1-2 years, but it has worsened.  She is concerned about falling and reports she must look consistently at the floor in order to walk without falling or running into objects. She reports she often runs into objects in her home or while out. She has limited her time away from home due to fear of falling and lack of balance and coordination. She is not using an assistive device, but does have a cane and walker from prior knee replacement surgery. She is concerned as she has a trip in October to [place] and she will be walking on uneven surfaces. She has trekking poles that she would like to use, but feels uncoordinated with them. She reported her last fall was 2 years ago and she was evaluated for possible TIA/CVA  No signs were noted on imaging. Pt does have some resting tremors due to medication and she has a rather flat affect. She has not been referred to neurology. She denies any headaches, nausea, vomiting or vertigo. She does report turning her head to the right makes her feel as though she will fall. She did have carotid testing 2 years ago with less than 50% occlusion. Past Medical History/Comorbidities:   Ms. Malathi Rosenthal  has a past medical history of Arthritis; Autoimmune disease ([address] and Psychiatric disorder. She also has no past medical history of Aneurysm ([address] Arrhythmia; Asthma; CAD (coronary artery disease); Cancer ([address] Chronic kidney disease; Chronic obstructive pulmonary disease ([address] Coagulation disorder ([address] Diabetes ([address] Difficult intubation; GERD (gastroesophageal reflux disease); Heart failure ([address] Hypertension; Liver disease; Malignant hyperthermia due to anesthesia; Morbid obesity ([address] Nausea & vomiting; Pseudocholinesterase deficiency; PUD (peptic ulcer disease); Seizures ([address] Stroke [facility]); Thromboembolus ([address] Thyroid disease; or Unspecified sleep apnea. Ms. Malathi Rosenthal  has a past surgical history that includes hx hysterectomy; hx tonsillectomy; hx adenoidectomy; hx knee arthroscopy (2013); hx breast biopsy (Left); and hx colostomy.   Social History/Living Environment:     pt lives with spouse in a 2 level home. She reports more difficulty with descending the stairs than ascending them  Prior Level of Function/Work/Activity:  Pt is a retired . Retired in 2011  Dominant Side:         RIGHT  Current Medications:       Current Outpatient Prescriptions:     HYDROCODONE/ACETAMINOPHEN (NORCO PO), Take  by mouth., Disp: , Rfl:     colestipol (COLESTID) 1 gram tablet, Take 1 g by mouth two (2) times a day., Disp: , Rfl:     Venlafaxine 150 mg tr24, Take 1 Tab by mouth daily. , Disp: , Rfl:     divalproex DR (DEPAKOTE) 500 mg tablet, Take 500 mg by mouth daily. Per anesthesia protocol:instructed to take am of surgery. Indications: MIXED BIPOLAR I DISORDER, Disp: , Rfl:     ziprasidone (GEODON) 20 mg capsule, Take 20 mg by mouth three (3) times daily. Takes 2 capsules at bedtime per pt. Indications: MIXED BIPOLAR I DISORDER, Disp: , Rfl:     zolpidem (AMBIEN) 10 mg tablet, Take 10 mg by mouth nightly as needed. , Disp: , Rfl:    Date Last Reviewed:  5/31/2018     Number of Personal Factors/Comorbidities that affect the Plan of Care: 1-2: MODERATE COMPLEXITY   EXAMINATION:   Observation/Orthostatic Postural Assessment:          Pt with slight ataxic tendencies towards her gait. Very startled righting reactions with eyes closed testing as well as any head movements to the right  ROM:     ROM WFL                                       Strength:     R hip flex 5/5, quad 5/5, HS 5/5, glut med 4/5, ankle PF/DF 5/5         L hip flex 4/5, quad 4/5, HS 4/5, glut med 4+/5, ankle PF/DF 5/5            Special Tests: na  Neurological Screen: pt exhibits decreased coordination and proprioceptive control  Balance:  poor   Body Structures Involved:  1. Eyes and Ears  2. Bones  3. Joints  4. Muscles  5. Ligaments Body Functions Affected:  1. Neuromusculoskeletal  2. Movement Related Activities and Participation Affected:  1. General Tasks and Demands  2. Mobility  3. Self Care  4.  Domestic Life  5. Interpersonal Interactions and Relationships  6. Community, Social and [place]   Number of elements (examined above) that affect the Plan of Care: 3: MODERATE COMPLEXITY   CLINICAL PRESENTATION:   Presentation: Evolving clinical presentation with changing clinical characteristics: MODERATE COMPLEXITY   CLINICAL DECISION MAKING:   Outcome Measure: Tool Used: Perez Balance Scale  Score:  Initial: 36/56 Most Recent: X/56 (Date: -- )   Interpretation of Score: Each section is scored on a 0-4 scale, 0 representing the patients inability to perform the task and 4 representing independence. The scores of each section are added together for a total score of 56. The higher the patients score, the more independent the patient is. Any score below 45 indicates increased risk for falls. Score 56 55-45 44-34 33-23 22-12 11-1 0   Modifier CH CI CJ CK CL CM CN     ? Mobility - Walking and Moving Around:     - CURRENT STATUS: CJ - 20%-39% impaired, limited or restricted    - GOAL STATUS: CI - 1%-19% impaired, limited or restricted    - D/C STATUS:  ---------------To be determined---------------    Medical Necessity:   · Patient is expected to demonstrate progress in strength, balance and coordination to improve safety during ambulation and all transfers. Reason for Services/Other Comments:  · Patient continues to require skilled intervention due to decreased balance, coordination and proprioceptive control that has limited her ability to get ouf of the house due to fear of falling. In addition, pt is considered to be a high risk for falls. Use of outcome tool(s) and clinical judgement create a POC that gives a: Questionable prediction of patient's progress: MODERATE COMPLEXITY               (In addition to Assessment/Re-Assessment sessions the following treatments were rendered)  Pre-treatment Symptoms/Complaints:  Pt reporting feeling better than her last treatment.  She was diagnosed with UTI and is taking meds. Pain: Initial:   Pain Intensity 1: 0  Post Session:  0/10     THERAPEUTIC EXERCISE: (20 minutes):  Exercises per grid below to improve mobility and strength. Required minimal verbal cues to promote proper body mechanics. Progressed resistance, range and repetitions as indicated. NEUROMUSCULAR RE-EDUCATION: (30 minutes):  Exercise/activities per grid below to improve balance and coordination. Required minimal verbal and tactile cues to promote dynamic balance in standing. Ther ex:   LAQ x 20 bilaterally-held  Sit to stand x 10 no use of hands  Seated swiss ball with upright posture x 2 min -held  Seated on swiss ball with trunk rotation to the left x 10 4# weight-held  Nu-step level 1 x 10 min  Ambulation with trekking poles x 70 feet and slight verbal cuing  Bilateral hip abduction 25# x 30  Bilateral hip extension 25# x 20  HS curls 20# x 20  Step-ups 6 inch no use of hands x 20  And use of trekking poles  Neuromuscular re-education:  SLS bilaterally x 20 sec on airex  airex marching x 30-  airex tandem stance bilaterally x 20 sec-  airex tandem stance with head turns x 10  rhomberg stance on floor with bilateral head turns-  Cone touches bilaterally with emphasis on eyes looking upwards or ahead of her-  Cone  x 1-  Buna  x 1-held  Turning to the right and taking a few steps x 10  Cone weaving and forward and backwards steps with 4# medicine ball one time to each side-  Obstacle course working on right and left turns as well as stepping up without handrails 2x 35 feet    Manual Therapy (   na   ):     Therapeutic Modalities:na    HEP: continue as directed      Treatment/Session Assessment:    · Response to Treatment:  Pt reporting less fatigue. She was slightly more imbalanced today with her standing and ambulation. She would often buckle her legs and then suddenly reach out for therapist hand or handrails. This might be due to new medication she is on for her UTI. She did ambulated with trekking poles and had to stop several times when her sequence was off. She had decreased proprioceptive control on the right with cone touches. She continues to require work on her balance to ensure safety with ambulation. · Compliance with Program/Exercises: Will assess as treatment progresses. · Recommendations/Intent for next treatment session: \"Next visit will focus on advancements to more challenging activities\".   Total Treatment Duration: 50 min  PT Patient Time In/Time Out  Time In: 1215  Time Out: 0105  Treatment number [id number] ENRIQUETA Dickerson, PT

## 2018-06-05 ENCOUNTER — HOSPITAL ENCOUNTER (OUTPATIENT)
Dept: PHYSICAL THERAPY | Age: 73
Discharge: HOME | End: 2018-06-05
Payer: MEDICARE

## 2018-06-05 NOTE — PROGRESS NOTES
Therapy Center at [facility] Therapy   [address], [facility] [address]  [phone]   [phone]    OUTPATIENT DAILY NOTE    NAME/AGE/GENDER: Tacos Nix is a 74 y.o. female. DATE: 6/5/2018    Patient cancelled appointment today due to conflicting dental appointment. Will plan to follow up on next scheduled visit.     Jevon Enriquez, PT

## 2018-06-07 ENCOUNTER — HOSPITAL ENCOUNTER (OUTPATIENT)
Dept: PHYSICAL THERAPY | Age: 73
Discharge: HOME | End: 2018-06-07
Payer: MEDICARE

## 2018-06-07 PROCEDURE — 97112 NEUROMUSCULAR REEDUCATION: CPT

## 2018-06-07 PROCEDURE — 97110 THERAPEUTIC EXERCISES: CPT

## 2018-06-07 NOTE — PROGRESS NOTES
Gavino Blackwood  : 1945  [place] / [place] / Product Type: Medicare /  [address]  at [place] [id number] [address], [address]  [phone]   [phone]         OUTPATIENT PHYSICAL THERAPY:Daily Note 2018    ICD-10: Treatment Diagnosis: difficulty walking R 26.2, muscle weakness  Unstable balance R 26.89    Precautions/Allergies:   Lactose   Fall Risk Score: 6 (? 5 = High Risk)  MD Orders: Eval and treat MEDICAL/REFERRING DIAGNOSIS:  Unstable balance [R26.89]  Weakness [R53.1]   DATE OF ONSET: at least a year or two  REFERRING PHYSICIAN: Rose Durant MD  RETURN PHYSICIAN APPOINTMENT: as needed     INITIAL ASSESSMENT:  Ms. Sanjeev Galloway presents with decreased balance and poor proprioceptive control. She reports she has had this problem for 1-2 years, but it has worsened. She is concerned about falling and reports she must look consistently at the floor in order to walk without falling or running into objects. She reports she often runs into objects in her home or while out. She has limited her time away from home due to fear of falling and lack of balance and coordination. She is not using an assistive device, but does have a cane and walker from prior knee replacement surgery. She is concerned as she has a trip in October to [place] and she will be walking on uneven surfaces. She has trekking poles that she would like to use, but feels uncoordinated with them. She reported her last fall was 2 years ago and she was evaluated for possible TIA/CVA  No signs were noted on imaging. Pt does have some resting tremors due to medication and she has a rather flat affect. She has not been referred to neurology. She denies any headaches, nausea, vomiting or vertigo. She does report turning her head to the right makes her feel as though she will fall. She did have carotid testing 2 years ago with less than 50% occlusion. PROBLEM LIST (Impacting functional limitations):  1. Decreased Strength  2. Decreased ADL/Functional Activities  3. Decreased Ambulation Ability/Technique  4. Decreased Balance  5. Decreased Flexibility/Joint Mobility INTERVENTIONS PLANNED:  1. Balance Exercise  2. Gait Training  3. Home Exercise Program (HEP)  4. Manual Therapy  5. Range of Motion (ROM)  6. Therapeutic Exercise/Strengthening   TREATMENT PLAN:  Effective Dates: 5/14/2018 TO 8/12/2018 (90 days). Frequency/Duration: 2 times a week for 90 Days    GOALS: (Goals have been discussed and agreed upon with patient.)  Short-Term Functional Goals: Time Frame: 6 weeks  1. Establish independent HEP with no cueing. 2. Pt will be able to report zero falls. 3. Pt will be able to gain 1/2 grade increase in strength on left quads and HS in order to climb stairs with more ease. Discharge Goals: Time Frame: 12 weeks  1. Improve score on Perez balance by at least 5 points to show improvements in balance and to decrease her overall fall risk. 2. Pt will be able to stand with eyes closed for 5- 10 seconds with CGA to SBA. 3. Pt will be able to perform tandem stance with SBA for 10 seconds. 4. Pt will be able to walk on uneven terrain with use of trekking poles and demonstrate good dynamic balance. Rehabilitation Potential For Stated Goals: [facility] therapy, I certify that the treatment plan above will be carried out by a therapist or under their direction. Thank you for this referral,  Gabriela Hurst, REYNALDO                 The information in this section was collected on 5/14/18 (except where otherwise noted). HISTORY:   History of Present Injury/Illness (Reason for Referral): INITIAL ASSESSMENT:  Ms. Cesar Bazan presents with decreased balance and poor proprioceptive control. She reports she has had this problem for 1-2 years, but it has worsened.  She is concerned about falling and reports she must look consistently at the floor in order to walk without falling or running into objects. She reports she often runs into objects in her home or while out. She has limited her time away from home due to fear of falling and lack of balance and coordination. She is not using an assistive device, but does have a cane and walker from prior knee replacement surgery. She is concerned as she has a trip in October to [place] and she will be walking on uneven surfaces. She has trekking poles that she would like to use, but feels uncoordinated with them. She reported her last fall was 2 years ago and she was evaluated for possible TIA/CVA  No signs were noted on imaging. Pt does have some resting tremors due to medication and she has a rather flat affect. She has not been referred to neurology. She denies any headaches, nausea, vomiting or vertigo. She does report turning her head to the right makes her feel as though she will fall. She did have carotid testing 2 years ago with less than 50% occlusion. Past Medical History/Comorbidities:   Ms. Chay Edwards  has a past medical history of Arthritis; Autoimmune disease ([address] and Psychiatric disorder. She also has no past medical history of Aneurysm ([address] Arrhythmia; Asthma; CAD (coronary artery disease); Cancer ([address] Chronic kidney disease; Chronic obstructive pulmonary disease ([address] Coagulation disorder ([address] Diabetes ([address] Difficult intubation; GERD (gastroesophageal reflux disease); Heart failure ([address] Hypertension; Liver disease; Malignant hyperthermia due to anesthesia; Morbid obesity ([address] Nausea & vomiting; Pseudocholinesterase deficiency; PUD (peptic ulcer disease); Seizures ([address] Stroke [facility]); Thromboembolus ([address] Thyroid disease; or Unspecified sleep apnea. Ms. Chay Edwards  has a past surgical history that includes hx hysterectomy; hx tonsillectomy; hx adenoidectomy; hx knee arthroscopy (2013); hx breast biopsy (Left); and hx colostomy.   Social History/Living Environment:     pt lives with spouse in a 2 level home. She reports more difficulty with descending the stairs than ascending them  Prior Level of Function/Work/Activity:  Pt is a retired . Retired in 2011  Dominant Side:         RIGHT  Current Medications:       Current Outpatient Prescriptions:     HYDROCODONE/ACETAMINOPHEN (NORCO PO), Take  by mouth., Disp: , Rfl:     colestipol (COLESTID) 1 gram tablet, Take 1 g by mouth two (2) times a day., Disp: , Rfl:     Venlafaxine 150 mg tr24, Take 1 Tab by mouth daily. , Disp: , Rfl:     divalproex DR (DEPAKOTE) 500 mg tablet, Take 500 mg by mouth daily. Per anesthesia protocol:instructed to take am of surgery. Indications: MIXED BIPOLAR I DISORDER, Disp: , Rfl:     ziprasidone (GEODON) 20 mg capsule, Take 20 mg by mouth three (3) times daily. Takes 2 capsules at bedtime per pt. Indications: MIXED BIPOLAR I DISORDER, Disp: , Rfl:     zolpidem (AMBIEN) 10 mg tablet, Take 10 mg by mouth nightly as needed. , Disp: , Rfl:    Date Last Reviewed:  6/7/2018     Number of Personal Factors/Comorbidities that affect the Plan of Care: 1-2: MODERATE COMPLEXITY   EXAMINATION:   Observation/Orthostatic Postural Assessment:          Pt with slight ataxic tendencies towards her gait. Very startled righting reactions with eyes closed testing as well as any head movements to the right  ROM:     ROM WFL                                       Strength:     R hip flex 5/5, quad 5/5, HS 5/5, glut med 4/5, ankle PF/DF 5/5         L hip flex 4/5, quad 4/5, HS 4/5, glut med 4+/5, ankle PF/DF 5/5            Special Tests: na  Neurological Screen: pt exhibits decreased coordination and proprioceptive control  Balance:  poor   Body Structures Involved:  1. Eyes and Ears  2. Bones  3. Joints  4. Muscles  5. Ligaments Body Functions Affected:  1. Neuromusculoskeletal  2. Movement Related Activities and Participation Affected:  1. General Tasks and Demands  2. Mobility  3. Self Care  4.  Domestic Life  5. Interpersonal Interactions and Relationships  6. Community, Social and [place]   Number of elements (examined above) that affect the Plan of Care: 3: MODERATE COMPLEXITY   CLINICAL PRESENTATION:   Presentation: Evolving clinical presentation with changing clinical characteristics: MODERATE COMPLEXITY   CLINICAL DECISION MAKING:   Outcome Measure: Tool Used: Perez Balance Scale  Score:  Initial: 36/56 Most Recent: X/56 (Date: -- )   Interpretation of Score: Each section is scored on a 0-4 scale, 0 representing the patients inability to perform the task and 4 representing independence. The scores of each section are added together for a total score of 56. The higher the patients score, the more independent the patient is. Any score below 45 indicates increased risk for falls. Score 56 55-45 44-34 33-23 22-12 11-1 0   Modifier CH CI CJ CK CL CM CN     ? Mobility - Walking and Moving Around:     - CURRENT STATUS: CJ - 20%-39% impaired, limited or restricted    - GOAL STATUS: CI - 1%-19% impaired, limited or restricted    - D/C STATUS:  ---------------To be determined---------------    Medical Necessity:   · Patient is expected to demonstrate progress in strength, balance and coordination to improve safety during ambulation and all transfers. Reason for Services/Other Comments:  · Patient continues to require skilled intervention due to decreased balance, coordination and proprioceptive control that has limited her ability to get ouf of the house due to fear of falling. In addition, pt is considered to be a high risk for falls. Use of outcome tool(s) and clinical judgement create a POC that gives a: Questionable prediction of patient's progress: MODERATE COMPLEXITY               (In addition to Assessment/Re-Assessment sessions the following treatments were rendered)  Pre-treatment Symptoms/Complaints:  Pt reporting feeling slightly better with turning to the right.   Pain: Initial: Pain Intensity 1: 0  Post Session:  0/10     THERAPEUTIC EXERCISE: (20 minutes):  Exercises per grid below to improve mobility and strength. Required minimal verbal cues to promote proper body mechanics. Progressed resistance, range and repetitions as indicated. NEUROMUSCULAR RE-EDUCATION: (30 minutes):  Exercise/activities per grid below to improve balance and coordination. Required minimal verbal and tactile cues to promote dynamic balance in standing. Ther ex:   LAQ x 20 bilaterally-held  Sit to stand x 10 no use of hands from lower stool  Seated swiss ball with upright posture x 2 min -held  Seated on swiss ball with trunk rotation to the left x 10 4# weight-held  Nu-step level 1 x 10 min  Ambulation with trekking poles x 200 feet and slight verbal cuing-outside  Bilateral hip abduction 25# x 30-held  Bilateral hip extension 25# x 20-held  HS curls 20# x 20  Step-ups 6 inch no use of hands x 20  -held  Neuromuscular re-education:  SLS bilaterally x 20 sec on airex  airex marching x 30-held  airex tandem stance bilaterally x 20 sec-  airex tandem stance with head turns x 10  rhomberg stance on floor with bilateral head turns-  Cone touches bilaterally with emphasis on eyes looking upwards or ahead of her-  Cone  x 1-held  [place]  x 1-held  Turning to the right and taking a few steps x 10  Cone weaving and forward and backwards steps with 4# medicine ball one time to each side-held  Obstacle course working on right and left turns as well as stepping up without handrails 2x 35 feet-held  Forward Cone touches with SLS-    Manual Therapy (   na   ):     Therapeutic Modalities:na    HEP: continue as directed      Treatment/Session Assessment:    · Response to Treatment:  Pt progressing and she is reporting improvement in turning her head to the right and maintaining her balance.   She had one loss of balance in the clinic with help from therapist to adjust.  She seems to lack coordination at times and is challenged by using her trekking poles outdoors. She stopped several times to readjust her steps and to ensure proper gait sequence. · Compliance with Program/Exercises: Will assess as treatment progresses. · Recommendations/Intent for next treatment session: \"Next visit will focus on advancements to more challenging activities\".   Total Treatment Duration: 50 min  PT Patient Time In/Time Out  Time In: 1215  Time Out: 0105  Treatment number [address], PT

## 2018-06-12 ENCOUNTER — HOSPITAL ENCOUNTER (OUTPATIENT)
Dept: PHYSICAL THERAPY | Age: 73
Discharge: HOME | End: 2018-06-12
Payer: MEDICARE

## 2018-06-12 NOTE — PROGRESS NOTES
Therapy Center at [facility] Therapy   [address], [facility] [address]  [phone]   [phone]    OUTPATIENT DAILY NOTE    NAME/AGE/GENDER: Marcos Rincon is a 74 y.o. female. DATE: 6/12/2018    Patient cancelled appointment today. Will plan to follow up on next scheduled visit.     Benjy Augustine, PT

## 2018-06-21 ENCOUNTER — HOSPITAL ENCOUNTER (OUTPATIENT)
Dept: PHYSICAL THERAPY | Age: 73
Discharge: HOME | End: 2018-06-21
Payer: MEDICARE

## 2018-06-21 NOTE — PROGRESS NOTES
Therapy Center at [facility] Therapy   [address], [facility] [address]  [phone]   [phone]    OUTPATIENT DAILY NOTE    NAME/AGE/GENDER: Batsheva Moss is a 74 y.o. female. DATE: 6/21/2018    Patient cancelled appointment today. Will plan to follow up on next scheduled visit.     Jon Araujo, PT

## 2018-06-26 ENCOUNTER — HOSPITAL ENCOUNTER (OUTPATIENT)
Dept: PHYSICAL THERAPY | Age: 73
Discharge: HOME | End: 2018-06-26
Payer: MEDICARE

## 2018-08-27 NOTE — THERAPY EVALUATION
Yolette Acosta  : 1945  [place] / Product Type: Medicare /  [address]  at [address], [facility] [address]  [phone]   [phone]         OUTPATIENT PHYSICAL THERAPY:Discontinuation Summary 2018    ICD-10: Treatment Diagnosis: difficulty walking R 26.2, muscle weakness  Unstable balance R 26.89    Precautions/Allergies:   Lactose   Fall Risk Score: 6 (? 5 = High Risk)  MD Orders: Eval and treat MEDICAL/REFERRING DIAGNOSIS:  Unstable balance [R26.89]  Weakness [R53.1]   DATE OF ONSET: at least a year or two  REFERRING PHYSICIAN: Jono Hardy MD  RETURN PHYSICIAN APPOINTMENT: as needed     INITIAL ASSESSMENT:  Ms. Ellyn Gannon presents with decreased balance and poor proprioceptive control. She reports she has had this problem for 1-2 years, but it has worsened. She is concerned about falling and reports she must look consistently at the floor in order to walk without falling or running into objects. She reports she often runs into objects in her home or while out. She has limited her time away from home due to fear of falling and lack of balance and coordination. She is not using an assistive device, but does have a cane and walker from prior knee replacement surgery. She is concerned as she has a trip in October to [place] and she will be walking on uneven surfaces. She has trekking poles that she would like to use, but feels uncoordinated with them. She reported her last fall was 2 years ago and she was evaluated for possible TIA/CVA  No signs were noted on imaging. Pt does have some resting tremors due to medication and she has a rather flat affect. She has not been referred to neurology. She denies any headaches, nausea, vomiting or vertigo. She does report turning her head to the right makes her feel as though she will fall.   She did have carotid testing 2 years ago with less than 50% occlusion. PROBLEM LIST (Impacting functional limitations):  1. Decreased Strength  2. Decreased ADL/Functional Activities  3. Decreased Ambulation Ability/Technique  4. Decreased Balance  5. Decreased Flexibility/Joint Mobility INTERVENTIONS PLANNED:  1. Balance Exercise  2. Gait Training  3. Home Exercise Program (HEP)  4. Manual Therapy  5. Range of Motion (ROM)  6. Therapeutic Exercise/Strengthening   TREATMENT PLAN:  Effective Dates: 5/14/2018 TO 8/12/2018 (90 days). Frequency/Duration: 2 times a week for 90 Days    GOALS: (Goals have been discussed and agreed upon with patient.)  Short-Term Functional Goals: Time Frame: 6 weeks  1. Establish independent HEP with no cueing.-met  2. Pt will be able to report zero falls. -met  3. Pt will be able to gain 1/2 grade increase in strength on left quads and HS in order to climb stairs with more ease.-in progress  Discharge Goals: Time Frame: 12 weeks  1. Improve score on Perez balance by at least 5 points to show improvements in balance and to decrease her overall fall risk.-in progress  2. Pt will be able to stand with eyes closed for 5- 10 seconds with CGA to SBA. -in progress  3. Pt will be able to perform tandem stance with SBA for 10 seconds.-in progress  4. Pt will be able to walk on uneven terrain with use of trekking poles and demonstrate good dynamic balance.-in progress  Rehabilitation Potential For Stated Goals: Fair  Regarding Abbe Davies's therapy, I certify that the treatment plan above will be carried out by a therapist or under their direction. Thank you for this referral,  Kamron Zarco, PT               Fannykatharine Marquez was  seen in physical therapy from 5/14/18 to 6/7/18 for 6 visits. Treatment has been discontinued at this time due to patient failing to return for additional treatment. She was progressing towards all short and long term goals set at evaluation with exception of establishing a HEP.   She cancelled several visits and did not show for several appointments and has not returned since 6/7/18. Therapy has been discontinued at this time.   Thank you for this referral.       Reji Gusman, YUNGT

## 2019-02-07 RX ORDER — PHENYLEPHRINE HYDROCHLORIDE 25 MG/ML
1 SOLUTION/ DROPS OPHTHALMIC
Status: CANCELLED | OUTPATIENT
Start: 2019-02-07 | End: 2019-02-07

## 2019-02-07 RX ORDER — CYCLOPENTOLATE HYDROCHLORIDE 20 MG/ML
1 SOLUTION/ DROPS OPHTHALMIC
Status: CANCELLED | OUTPATIENT
Start: 2019-02-07 | End: 2019-02-07

## 2019-02-07 RX ORDER — TROPICAMIDE 10 MG/ML
1 SOLUTION/ DROPS OPHTHALMIC
Status: CANCELLED | OUTPATIENT
Start: 2019-02-07 | End: 2019-02-07

## 2019-02-12 ENCOUNTER — HOSPITAL ENCOUNTER (OUTPATIENT)
Dept: SURGERY | Age: 74
Discharge: HOME | End: 2019-02-12

## 2019-02-12 VITALS
BODY MASS INDEX: 31.54 KG/M2 | DIASTOLIC BLOOD PRESSURE: 78 MMHG | HEIGHT: 61 IN | HEART RATE: 90 BPM | SYSTOLIC BLOOD PRESSURE: 148 MMHG | RESPIRATION RATE: 16 BRPM | WEIGHT: 167.06 LBS | OXYGEN SATURATION: 97 % | TEMPERATURE: 98.1 F

## 2019-02-12 RX ORDER — GUAIFENESIN 100 MG/5ML
81 LIQUID (ML) ORAL DAILY
COMMUNITY

## 2019-02-12 RX ORDER — CLOPIDOGREL BISULFATE 75 MG/1
75 TABLET ORAL
COMMUNITY

## 2019-02-12 RX ORDER — BISMUTH SUBSALICYLATE 262 MG
1 TABLET,CHEWABLE ORAL DAILY
COMMUNITY

## 2019-02-12 RX ORDER — LISINOPRIL 10 MG/1
10 TABLET ORAL DAILY
COMMUNITY

## 2019-02-12 NOTE — PERIOP NOTES
Patient verified name and . Patient provided medical/health information and PTA medications to the best of their ability. TYPE  CASE:lb  Order for consent  found in EHR and matches case posting. Labs per surgeon:None. Labs per anesthesia protocol: None. EKG  :  None, Patient takes Plavix due to possibly having CVA, did not go to hospital when symptoms happened so no diagnosis. States her family doctor placed her on it for precautions. Clearance received to hold ASA 81 mg and Plavix for 5 days prior to procedure on chart. Patient provided with and instructed on education handouts including Guide to Surgery, blood transfusions, pain management, and hand hygiene for the family and community, and [facility] brochure. Antibacterial soap and Hibiclens instructions given per hospital policy. Instructed patient to continue previous medications as prescribed prior to surgery unless otherwise directed and to take the following medications the day of surgery according to anesthesia guidelines : Venlafaxine, Ziprasidone . Instructed patient to hold  the following medications: Plavix and ASA 81 mg for 5 days prior and Vitamins for 7 days prior. .    Original medication prescription bottles were visualized during patient appointment. Patient teach back successful and patient demonstrates knowledge of instruction.

## 2019-02-18 ENCOUNTER — ANESTHESIA EVENT (OUTPATIENT)
Dept: SURGERY | Age: 74
End: 2019-02-18
Payer: MEDICARE

## 2019-02-19 ENCOUNTER — HOSPITAL ENCOUNTER (OUTPATIENT)
Age: 74
Setting detail: OUTPATIENT SURGERY
Discharge: HOME | End: 2019-02-19
Attending: OPHTHALMOLOGY | Admitting: OPHTHALMOLOGY
Payer: MEDICARE

## 2019-02-19 ENCOUNTER — ANESTHESIA (OUTPATIENT)
Dept: SURGERY | Age: 74
End: 2019-02-19
Payer: MEDICARE

## 2019-02-19 VITALS
HEART RATE: 89 BPM | BODY MASS INDEX: 31.53 KG/M2 | OXYGEN SATURATION: 97 % | RESPIRATION RATE: 16 BRPM | HEIGHT: 61 IN | TEMPERATURE: 97.6 F | WEIGHT: 167 LBS | SYSTOLIC BLOOD PRESSURE: 135 MMHG | DIASTOLIC BLOOD PRESSURE: 67 MMHG

## 2019-02-19 LAB — GLUCOSE BLD STRIP.AUTO-MCNC: 203 MG/DL (ref 65–100)

## 2019-02-19 PROCEDURE — 74011000254 HC RX REV CODE- 254: Performed by: OPHTHALMOLOGY

## 2019-02-19 PROCEDURE — 76060000033 HC ANESTHESIA 1 TO 1.5 HR: Performed by: OPHTHALMOLOGY

## 2019-02-19 PROCEDURE — 74011250636 HC RX REV CODE- 250/636

## 2019-02-19 PROCEDURE — 74011250636 HC RX REV CODE- 250/636: Performed by: ANESTHESIOLOGY

## 2019-02-19 PROCEDURE — 74011000250 HC RX REV CODE- 250: Performed by: OPHTHALMOLOGY

## 2019-02-19 PROCEDURE — 77030018838 HC SOL IRR OPTH ALCN -B: Performed by: OPHTHALMOLOGY

## 2019-02-19 PROCEDURE — 77030017621 HC NDL OPHTH1 ALCN -B: Performed by: OPHTHALMOLOGY

## 2019-02-19 PROCEDURE — 76010000161 HC OR TIME 1 TO 1.5 HR INTENSV-TIER 1: Performed by: OPHTHALMOLOGY

## 2019-02-19 PROCEDURE — 82962 GLUCOSE BLOOD TEST: CPT

## 2019-02-19 PROCEDURE — 77030018846 HC SOL IRR STRL H20 ICUM -A: Performed by: OPHTHALMOLOGY

## 2019-02-19 PROCEDURE — 76210000020 HC REC RM PH II FIRST 0.5 HR: Performed by: OPHTHALMOLOGY

## 2019-02-19 PROCEDURE — 77030038091: Performed by: OPHTHALMOLOGY

## 2019-02-19 PROCEDURE — 77030008547 HC TBNG OPHTH BD -A: Performed by: OPHTHALMOLOGY

## 2019-02-19 PROCEDURE — 77030032623 HC KT VITRCMY TOT PLS ALCN -F: Performed by: OPHTHALMOLOGY

## 2019-02-19 PROCEDURE — 77030013673 HC FCPS OPTH ALCN -C: Performed by: OPHTHALMOLOGY

## 2019-02-19 PROCEDURE — 77030018837 HC SOL IRR OPTH ALCN -A: Performed by: OPHTHALMOLOGY

## 2019-02-19 PROCEDURE — 74011250636 HC RX REV CODE- 250/636: Performed by: OPHTHALMOLOGY

## 2019-02-19 PROCEDURE — 76210000063 HC OR PH I REC FIRST 0.5 HR: Performed by: OPHTHALMOLOGY

## 2019-02-19 RX ORDER — ONDANSETRON 2 MG/ML
INJECTION INTRAMUSCULAR; INTRAVENOUS AS NEEDED
Status: DISCONTINUED | OUTPATIENT
Start: 2019-02-19 | End: 2019-02-19 | Stop reason: HOSPADM

## 2019-02-19 RX ORDER — HYDROCODONE BITARTRATE AND ACETAMINOPHEN 5; 325 MG/1; MG/1
2 TABLET ORAL AS NEEDED
Status: DISCONTINUED | OUTPATIENT
Start: 2019-02-19 | End: 2019-02-19 | Stop reason: HOSPADM

## 2019-02-19 RX ORDER — CEFAZOLIN SODIUM 1 G/3ML
INJECTION, POWDER, FOR SOLUTION INTRAMUSCULAR; INTRAVENOUS AS NEEDED
Status: DISCONTINUED | OUTPATIENT
Start: 2019-02-19 | End: 2019-02-19 | Stop reason: HOSPADM

## 2019-02-19 RX ORDER — INDOCYANINE GREEN AND WATER 25 MG
KIT INJECTION AS NEEDED
Status: DISCONTINUED | OUTPATIENT
Start: 2019-02-19 | End: 2019-02-19 | Stop reason: HOSPADM

## 2019-02-19 RX ORDER — LIDOCAINE HYDROCHLORIDE 20 MG/ML
INJECTION, SOLUTION EPIDURAL; INFILTRATION; INTRACAUDAL; PERINEURAL AS NEEDED
Status: DISCONTINUED | OUTPATIENT
Start: 2019-02-19 | End: 2019-02-19 | Stop reason: HOSPADM

## 2019-02-19 RX ORDER — TETRACAINE HYDROCHLORIDE 5 MG/ML
SOLUTION OPHTHALMIC AS NEEDED
Status: DISCONTINUED | OUTPATIENT
Start: 2019-02-19 | End: 2019-02-19 | Stop reason: HOSPADM

## 2019-02-19 RX ORDER — OXYCODONE HYDROCHLORIDE 5 MG/1
5 TABLET ORAL
Status: DISCONTINUED | OUTPATIENT
Start: 2019-02-19 | End: 2019-02-19 | Stop reason: HOSPADM

## 2019-02-19 RX ORDER — BETAMETHASONE SODIUM PHOSPHATE AND BETAMETHASONE ACETATE 3; 3 MG/ML; MG/ML
INJECTION, SUSPENSION INTRA-ARTICULAR; INTRALESIONAL; INTRAMUSCULAR; SOFT TISSUE AS NEEDED
Status: DISCONTINUED | OUTPATIENT
Start: 2019-02-19 | End: 2019-02-19 | Stop reason: HOSPADM

## 2019-02-19 RX ORDER — HYDROMORPHONE HYDROCHLORIDE 2 MG/ML
0.5 INJECTION, SOLUTION INTRAMUSCULAR; INTRAVENOUS; SUBCUTANEOUS
Status: DISCONTINUED | OUTPATIENT
Start: 2019-02-19 | End: 2019-02-19 | Stop reason: HOSPADM

## 2019-02-19 RX ORDER — MIDAZOLAM HYDROCHLORIDE 1 MG/ML
2 INJECTION, SOLUTION INTRAMUSCULAR; INTRAVENOUS
Status: DISCONTINUED | OUTPATIENT
Start: 2019-02-19 | End: 2019-02-19 | Stop reason: HOSPADM

## 2019-02-19 RX ORDER — PROPOFOL 10 MG/ML
INJECTION, EMULSION INTRAVENOUS AS NEEDED
Status: DISCONTINUED | OUTPATIENT
Start: 2019-02-19 | End: 2019-02-19 | Stop reason: HOSPADM

## 2019-02-19 RX ORDER — LIDOCAINE HYDROCHLORIDE 20 MG/ML
INJECTION, SOLUTION INFILTRATION; PERINEURAL AS NEEDED
Status: DISCONTINUED | OUTPATIENT
Start: 2019-02-19 | End: 2019-02-19 | Stop reason: HOSPADM

## 2019-02-19 RX ORDER — CYCLOPENTOLATE HYDROCHLORIDE 20 MG/ML
1 SOLUTION/ DROPS OPHTHALMIC
Status: COMPLETED | OUTPATIENT
Start: 2019-02-19 | End: 2019-02-19

## 2019-02-19 RX ORDER — LIDOCAINE HYDROCHLORIDE 10 MG/ML
0.1 INJECTION INFILTRATION; PERINEURAL AS NEEDED
Status: DISCONTINUED | OUTPATIENT
Start: 2019-02-19 | End: 2019-02-19 | Stop reason: HOSPADM

## 2019-02-19 RX ORDER — BUPIVACAINE HYDROCHLORIDE 5 MG/ML
INJECTION, SOLUTION EPIDURAL; INTRACAUDAL AS NEEDED
Status: DISCONTINUED | OUTPATIENT
Start: 2019-02-19 | End: 2019-02-19 | Stop reason: HOSPADM

## 2019-02-19 RX ORDER — SODIUM CHLORIDE, SODIUM LACTATE, POTASSIUM CHLORIDE, CALCIUM CHLORIDE 600; 310; 30; 20 MG/100ML; MG/100ML; MG/100ML; MG/100ML
75 INJECTION, SOLUTION INTRAVENOUS CONTINUOUS
Status: DISCONTINUED | OUTPATIENT
Start: 2019-02-19 | End: 2019-02-19 | Stop reason: HOSPADM

## 2019-02-19 RX ORDER — SODIUM CHLORIDE 9 MG/ML
INJECTION INTRAMUSCULAR; INTRAVENOUS; SUBCUTANEOUS AS NEEDED
Status: DISCONTINUED | OUTPATIENT
Start: 2019-02-19 | End: 2019-02-19 | Stop reason: HOSPADM

## 2019-02-19 RX ORDER — TROPICAMIDE 10 MG/ML
1 SOLUTION/ DROPS OPHTHALMIC
Status: COMPLETED | OUTPATIENT
Start: 2019-02-19 | End: 2019-02-19

## 2019-02-19 RX ORDER — PHENYLEPHRINE HYDROCHLORIDE 25 MG/ML
1 SOLUTION/ DROPS OPHTHALMIC
Status: COMPLETED | OUTPATIENT
Start: 2019-02-19 | End: 2019-02-19

## 2019-02-19 RX ORDER — FENTANYL CITRATE 50 UG/ML
100 INJECTION, SOLUTION INTRAMUSCULAR; INTRAVENOUS ONCE
Status: DISCONTINUED | OUTPATIENT
Start: 2019-02-19 | End: 2019-02-19 | Stop reason: HOSPADM

## 2019-02-19 RX ADMIN — SODIUM CHLORIDE, SODIUM LACTATE, POTASSIUM CHLORIDE, AND CALCIUM CHLORIDE: 600; 310; 30; 20 INJECTION, SOLUTION INTRAVENOUS at 09:27

## 2019-02-19 RX ADMIN — CYCLOPENTOLATE HYDROCHLORIDE 1 DROP: 20 SOLUTION/ DROPS OPHTHALMIC at 07:40

## 2019-02-19 RX ADMIN — PHENYLEPHRINE HYDROCHLORIDE 1 DROP: 2.5 SOLUTION/ DROPS OPHTHALMIC at 07:40

## 2019-02-19 RX ADMIN — CYCLOPENTOLATE HYDROCHLORIDE 1 DROP: 20 SOLUTION/ DROPS OPHTHALMIC at 07:35

## 2019-02-19 RX ADMIN — PROPOFOL 10 MG: 10 INJECTION, EMULSION INTRAVENOUS at 09:26

## 2019-02-19 RX ADMIN — PROPOFOL 20 MG: 10 INJECTION, EMULSION INTRAVENOUS at 08:42

## 2019-02-19 RX ADMIN — TROPICAMIDE 1 DROP: 10 SOLUTION/ DROPS OPHTHALMIC at 07:35

## 2019-02-19 RX ADMIN — ONDANSETRON 4 MG: 2 INJECTION INTRAMUSCULAR; INTRAVENOUS at 09:30

## 2019-02-19 RX ADMIN — TROPICAMIDE 1 DROP: 10 SOLUTION/ DROPS OPHTHALMIC at 07:40

## 2019-02-19 RX ADMIN — SODIUM CHLORIDE, SODIUM LACTATE, POTASSIUM CHLORIDE, AND CALCIUM CHLORIDE 75 ML/HR: 600; 310; 30; 20 INJECTION, SOLUTION INTRAVENOUS at 07:30

## 2019-02-19 RX ADMIN — PROPOFOL 30 MG: 10 INJECTION, EMULSION INTRAVENOUS at 08:41

## 2019-02-19 RX ADMIN — PROPOFOL 10 MG: 10 INJECTION, EMULSION INTRAVENOUS at 09:23

## 2019-02-19 RX ADMIN — PHENYLEPHRINE HYDROCHLORIDE 1 DROP: 2.5 SOLUTION/ DROPS OPHTHALMIC at 07:35

## 2019-02-19 RX ADMIN — PROPOFOL 10 MG: 10 INJECTION, EMULSION INTRAVENOUS at 09:24

## 2019-02-19 RX ADMIN — TROPICAMIDE 1 DROP: 10 SOLUTION/ DROPS OPHTHALMIC at 07:45

## 2019-02-19 RX ADMIN — PHENYLEPHRINE HYDROCHLORIDE 1 DROP: 2.5 SOLUTION/ DROPS OPHTHALMIC at 07:45

## 2019-02-19 RX ADMIN — PROPOFOL 20 MG: 10 INJECTION, EMULSION INTRAVENOUS at 08:40

## 2019-02-19 RX ADMIN — SODIUM CHLORIDE, SODIUM LACTATE, POTASSIUM CHLORIDE, AND CALCIUM CHLORIDE: 600; 310; 30; 20 INJECTION, SOLUTION INTRAVENOUS at 08:29

## 2019-02-19 RX ADMIN — CYCLOPENTOLATE HYDROCHLORIDE 1 DROP: 20 SOLUTION/ DROPS OPHTHALMIC at 07:45

## 2019-02-19 RX ADMIN — LIDOCAINE HYDROCHLORIDE 50 MG: 20 INJECTION, SOLUTION EPIDURAL; INFILTRATION; INTRACAUDAL; PERINEURAL at 08:40

## 2019-02-19 NOTE — BRIEF OP NOTE
BRIEF OPERATIVE NOTE Date of Procedure: 2/19/2019 Preoperative Diagnosis: Macular hole of right eye [S04.995] Postoperative Diagnosis: Macular hole of right eye [H35.341] Procedure(s): RIGHT VITRECTOMY POSTERIOR 25 GAUGE / LASER / GAS FLUID EXCHANGE ( SF 6) / ICG DYE Surgeon(s) and Role: * Roman Ceballos MD - Primary Surgical Assistant: None Surgical Staff: 
Circ-1: Nikhil Giles RN Scrub Tech-1: Cassandra Smart Time In Time Out Incision Start 0840 Incision Close 3699 Anesthesia: MAC Estimated Blood Loss: None Specimens: * No specimens in log * Findings: Stage 4 macular hole, nonexudative macular degeneration right eye. Dictation# [id number] Complications: None Implants: * No implants in log *

## 2019-02-19 NOTE — DISCHARGE INSTRUCTIONS
[facility], [facility] MD Ashanti Boggs MD Glendale Brier. MD Manav Carlton. Shaina Abdalla MD    [phone] or [phone] or [phone] or [phone]    Post Operative Instructions for Retina and Vitreous Surgery Patients    The following are a few guidelines that you should observe for two weeks after surgery:    1. Avoid stooping over, lifting heavy weights or bumping your head. 2.  Special positioning: MAY SIT UP, BUT KEEP HEAD DOWN. REST ON EITHER SIDE WITH FACE TURNED DOWN    3. No extensive traveling except what is necessary. If a gas air bubble was put in your      eye at surgery - avoid air travel until you consult your doctor. 4.  You may shave after the third day. 5.  You may watch television, read, cook and wash dishes as long as it does not interfere        with special positioning instructions. 6.  Alcoholic beverages may be used in moderation. 7.  No sexual intercourse. 8.  You  may bathe the eyelids daily with cotton or a tissue moistened with warm tap       water. Do not bathe the eyeball itself. 9.  Some discharge from the operated eye is to be expected. This should gradually        improve. 10. Change the eye pad at least once daily. You may discontinue the eye pad whenever        comfortable to do so (usually three days after the operation). Wear the eye shield or        glasses at all times. 11. If you experience increasing pain, decreasing vision, or pus like discharge, call the        Office. 12. Medication Instructions:         Prednisolone 1% - one drop in the operated eye _4__ times a day. _Ofloxacin__(antibiotic drop) - one drop in operated eye 4 times a day. BRING ALL EYE DROPS TO YOU POSTOPERATIVE APPOINTMENT!     13. Return appointment at the [facility] On: 2/20/19 at 9:20AM with  Oliver Horne or demonstrated understanding:  YES    DISCHARGE SUMMARY from Nurse    PATIENT INSTRUCTIONS:    After general anesthesia or intravenous sedation, for 24 hours or while taking prescription Narcotics:  · Limit your activities  · Do not drive and operate hazardous machinery  · Do not make important personal or business decisions  · Do  not drink alcoholic beverages  · If you have not urinated within 8 hours after discharge, please contact your surgeon on call. *  Please give a list of your current medications to your Primary Care Provider. *  Please update this list whenever your medications are discontinued, doses are      changed, or new medications (including over-the-counter products) are added. *  Please carry medication information at all times in case of emergency situations. These are general instructions for a healthy lifestyle:    No smoking/ No tobacco products/ Avoid exposure to second hand smoke    Surgeon General's Warning:  Quitting smoking now greatly reduces serious risk to your health. Obesity, smoking, and sedentary lifestyle greatly increases your risk for illness    A healthy diet, regular physical exercise & weight monitoring are important for maintaining a healthy lifestyle    You may be retaining fluid if you have a history of heart failure or if you experience any of the following symptoms:  Weight gain of 3 pounds or more overnight or 5 pounds in a week, increased swelling in our hands or feet or shortness of breath while lying flat in bed. Please call your doctor as soon as you notice any of these symptoms; do not wait until your next office visit. Recognize signs and symptoms of STROKE:    F-face looks uneven    A-arms unable to move or move unevenly    S-speech slurred or non-existent    T-time-call 911 as soon as signs and symptoms begin-DO NOT go       Back to bed or wait to see if you get better-TIME IS BRAIN.

## 2019-02-19 NOTE — OP NOTES
[facility]  OPERATIVE REPORT    Name:  Ayah Galeano  MR#:  [id number]  :  1945  ACCOUNT #:  [de-identified]  DATE OF SERVICE:  2019      PREOPERATIVE DIAGNOSES:  1.  Stage IV full-thickness macular hole of the right eye. 2.  Nonexudative macular degeneration of the right eye. 3.  Asteroid hyalosis of the right eye. 4.  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 POSTOPERATIVE DIAGNOSES:  1.  Stage IV full-thickness macular hole of the right eye. 2.  Nonexudative macular degeneration of the right eye. 3.  Asteroid hyalosis of the right eye. 4.  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 PROCEDURE PERFORMED:  1. Repair of macular hole of the right eye by vitrectomy. 2.  Epiretinal membrane removal.  3.  Gas-fluid exchange. 4.  Laser. SURGEON:  Kristopher Mesa. Tucker LONG MD    ASSISTANT:  None. ANESTHESIA:  MAC.    COMPLICATIONS:  None. SPECIMENS REMOVED:  None. IMPLANTS:  None    ESTIMATED BLOOD LOSS:  None. INDICATIONS FOR SURGERY:  The patient has experienced loss of vision in the right eye  associated with a full-thickness macular hole. Surgery was recommended to repair the  macular hole in order to improve vision. The risks and benefits of the surgical  procedure were thoroughly reviewed and the patient wished to proceed. SUMMARY OF PROCEDURE:  After appropriate preoperative evaluation and signing the  operative permit, the patient was taken to the operating room and placed in a supine  fashion upon the procedure table. A time-out was then performed and all operating  room personnel confirmed the patient's identity, the surgical site, and the  procedures to be performed. The patient was then given IV sedation as well as a lid  block on the right consisting of 5 mL of 50:50 solution of 2% Xylocaine and 0.75%  Marcaine. This was given in a modified [employer] fashion. A retrobulbar injection  consisting of 5 mL of same solution was then given. Once an appropriate level of  akinesia of the extraocular muscle was achieved, the patient was draped and prepped  in sterile ophthalmic fashion with 5% Betadine solution. The conjunctiva and  periorbital tissues were carefully prepped. A 23-gauge 4 mm infusion cannula was placed 4 mm posterior to the limbus at the 8  o'clock position. Once the tip of the cannula was visualized and posterior segment  found to be clear, it was turned to the on position and the intraocular pressure was  adjusted to 30 mmHg. Two additional cannulas were placed at the 10 and 2 o'clock  positions 4 mm posterior to the limbus. A handheld fiberoptic tube was inserted into the nasal cannula site, while the micro  instrument was inserted into the temporal cannula site. Visualization of the  posterior segment was achieved using the overhead microscope and a handheld contact  lens. Examination of the posterior segment confirmed diffuse dense asteroid  hyalosis. This was carefully removed with the micro instrument. The vitreous was  trimmed to the far periphery in all quadrants. It should also be noted that  posterior vitreous attachment was present. Inspection of posterior pole then  revealed a 300 x 300 micron full-thickness macular hole. Pigmentary changes and  drusen were present in the central macular region consistent with macular  degeneration. 0.3 mL of ICG dye was placed over the posterior pole region and allowed to settle for  approximately 1 minute. The dye was then removed distally with needle. This allowed  staining of the internal limiting membranes and the epiretinal membranes. End-gripping forceps were then used to carefully elevate the membrane to the superior  macular region. The sheaths of membranes were dissected from the retinal surface and  removed from the margins of the macular hole. Once all scar tissue was removed from  the retina, attention was then directed to the periphery.     The peripheral retina was then inspected by indirect ophthalmoscopy and scleral  depression. No peripheral retinal breaks or tears were identified. An air-fluid  exchange was then performed. All fluid was removed from the retinal surface. The  indirect laser was then used to apply laser treatment throughout the far periphery  between the ora surrounding the vortex veins. Approximately 413 spots were placed. All instruments were then removed from the eye and 20% solution of SF6 gas was  infused through the infusion cannula. The intraocular pressure was adjusted to  approximately 15 mmHg and all cannulas were removed from the eye. No leakage was  noted from the cannula sites. A subconjunctival injection consisting of 0.5 mL of  Ancef and 0.5 mL of betamethasone was given in the inferior temporal quadrant. Prednisolone and ofloxacin were placed in the inferior fornix. A patch and shield  were placed over the right eye. The patient tolerated the procedure well and was  taken to recovery room for routine postoperative care. ADDENDUM:  It should be noted that following the air-fluid exchange, the corneal  epithelium became edematous. This resulted in decreased visualization of the  posterior segment; therefore, the corneal epithelium was removed with a 64 blade. This improved visualization of the posterior segment and the procedure could be  completed.       Marshall Arthur MD [id number]  D:  02/19/2019 9:51  T:  02/19/2019 17:29  JOB #:  [id number]

## 2019-02-19 NOTE — ANESTHESIA POSTPROCEDURE EVALUATION
Procedure(s): RIGHT VITRECTOMY POSTERIOR 25 GAUGE / LASER / GAS FLUID EXCHANGE ( SF 6) / ICG DYE. Anesthesia Post Evaluation Multimodal analgesia: multimodal analgesia used between 6 hours prior to anesthesia start to PACU discharge Patient location during evaluation: bedside Patient participation: complete - patient participated Level of consciousness: awake and alert Pain score: 3 Pain management: adequate Airway patency: patent Anesthetic complications: no 
Cardiovascular status: acceptable and hemodynamically stable Respiratory status: acceptable Hydration status: acceptable Post anesthesia nausea and vomiting:  none Visit Vitals /62 Pulse 78 Temp 36.4 °C (97.6 °F) Resp 14 Ht 5' 1\" (1.549 m) Wt 75.8 kg (167 lb) SpO2 98% BMI 31.55 kg/m²

## 2019-02-19 NOTE — ANESTHESIA PREPROCEDURE EVALUATION
Anesthetic History No history of anesthetic complications Review of Systems / Medical History Patient summary reviewed and pertinent labs reviewed Pulmonary Within defined limits Neuro/Psych  
 
 
 
TIA (on bASA and plavix) Cardiovascular Hypertension: well controlled Exercise tolerance: >4 METS 
  
GI/Hepatic/Renal 
Within defined limits Endo/Other Arthritis Other Findings Physical Exam 
 
Airway Mallampati: II 
TM Distance: 4 - 6 cm Neck ROM: normal range of motion Mouth opening: Normal 
 
 Cardiovascular Regular rate and rhythm,  S1 and S2 normal,  no murmur, click, rub, or gallop Dental 
 
 
  
Pulmonary Breath sounds clear to auscultation Abdominal 
 
 
 
 Other Findings Anesthetic Plan ASA: 2 Anesthesia type: total IV anesthesia Induction: Intravenous Anesthetic plan and risks discussed with: Patient

## 2020-02-26 ENCOUNTER — HOSPITAL ENCOUNTER (OUTPATIENT)
Dept: PHYSICAL THERAPY | Age: 75
Discharge: HOME | End: 2020-02-26
Payer: MEDICARE

## 2020-02-26 PROCEDURE — 97110 THERAPEUTIC EXERCISES: CPT

## 2020-02-26 PROCEDURE — 97162 PT EVAL MOD COMPLEX 30 MIN: CPT

## 2020-02-26 PROCEDURE — 97535 SELF CARE MNGMENT TRAINING: CPT

## 2020-02-26 NOTE — THERAPY EVALUATION
Solange Wolfe  : 1945  [place]  Secondary: [address] [facility]  [address] [facility], [address] [place] [address]  [phone]   [phone]         OUTPATIENT PHYSICAL THERAPY:Initial Assessment 2020   ICD-10: Treatment Diagnosis: N39.41 Urge incontinence, N39.3 Stress Urinary Incontinence, R27.8 Lack of coordination (muscle incoordination), R39.89 Functional Urinary Incontinence, M62.81 Generalized weakness,     Precautions/Allergies:   Lactose   TREATMENT PLAN:  Effective Dates: 2020 TO 2020 (90 days). Frequency/Duration: 2 times a week for 90 Day(s) MEDICAL/REFERRING DIAGNOSIS:  Stress incontinence (female) (male) [N39.3]   DATE OF ONSET:   REFERRING PHYSICIAN: Katie Eagle,*  MD Orders: Eval and Treat for UI  Return MD Appointment: Unknown     INITIAL ASSESSMENT:  Ms. Alicia Olmedo presents with pelvic floor muscle weakness and abdominal weakness likely secondary to increased tension in her pelvic floor and abdominal muscles. She demonstrates difficulty coordinating a pelvic floor contraction without compensatory use of her adductors, gluteals, and breath holding. She also demonstrates poor coordination between her diaphragm and pelvic floor to lengthen her muscles correctly. Lastly, she demonstrates weakness through her transverse abdominis. These limitations are impairing her ability to transition sit-to-stand without UI, transition supine to sit, cough/laugh/sneeze without UI, and control urinary urges. PROBLEM LIST (Impacting functional limitations):  1. Decreased Strength  2. Decreased ADL/Functional Activities  3. Decreased Transfer Abilities  4. Decreased Balance  5. Increased Pain  6. Decreased Activity Tolerance  7. Decreased Flexibility/Joint Mobility  8. Decreased [place] with Home Exercise Program  9.  Decreased strength of pelvic floor which limits bladder contro INTERVENTIONS PLANNED: (Treatment may consist of any combination of the following)  1. Neuromuscular re-education  2. Biofeedback as needed  3. HEP  4. Bladder retraining  5. Bladder education  6. Balance Exercise  7. Bed Mobility  8. Home Exercise Program (HEP)  9. Manual Therapy  10. Neuromuscular Re-education/Strengthening  11. Range of Motion (ROM)  12. Therapeutic Activites  13. Therapeutic Exercise/Strengthening     GOALS: (Goals have been discussed and agreed upon with patient.)   Short Term Goals: 4 weeks  1. Pt will verbalize understanding of the pelvic floor muscle functions and demonstrate ability to properly coordinate a pelvic floor contraction with her diaphragm and transverse abdominis. 2. Pt will be independent in a home exercise program, promoting coordination of the pelvic floor and generalized LE strengthening. 3. Pt will be independent in timed voiding. Long Term Goals: 8 weeks  1. Pt will transition sit-to-stand without report of UI >50% of the time. 2. Pt will be independent in performing the knack to reduce episodes of ADELINE as measured by pad usage  By 50% (1-2 pads/day). 3. Pt will report no falls. OUTCOME MEASURE:   Tool Used: Pelvic Floor Disability Index (PFDI-20)  Score:  Initial: Measure on follow-up Most Recent: X (Date: -- )   Interpretation of Score: This survey asks questions concerning certain  bowel, bladder, or pelvic symptoms and how much this symptoms interfere with daily activiites. Each section is scored on a 0-4 scale, 4 representing the greatest disability. The scores of each section are added together for a total score out of 70. MEDICAL NECESSITY:   · Patient is expected to demonstrate progress in strength, range of motion, balance, coordination and functional technique to increase independence with transitioning sitting to standing, supine to sit, and controlling urinary incontinence.  .  REASON FOR SERVICES/OTHER COMMENTS:  · Patient continues to require skilled intervention due to medical complications. Total Duration: 60 minutes       Rehabilitation Potential For Stated Goals: Good  Regarding Rolanda Davies's therapy, I certify that the treatment plan above will be carried out by a therapist or under their direction. Thank you for this referral,  Eliseo Smith, PT     Referring Physician Signature: Sallie Bass,* _______________________________ Date _____________     PAIN/SUBJECTIVE:   Initial:   3/10 Post Session:  3/10   HISTORY:   History of Injury/Illness (Reason for Referral):  Pt reports worsening of urinary incontinence since colectomy and colostomy (Hartmans procedure, 2016) due to perforated diverticulitis. She also had an abdominal hernia repair with mesh (2018). Hysterectomy 40 years ago, with unknown reason. Urinary incontinence occurs >75% of time with transitions from sit-to-stand, prolonged ambulation with a full bladder, coughing, bending with full bladder. Pt also reports UI with sudden urges and is unable to make it to the restroom in time 100% of the time. Unsure she is able to contract her pelvic floor muscles. Reports sensation present. Urinary: Frequency 5-8x/day, 0 x/night. Positive for stress urinary incontinence and urge urinary incontinence. Negative for incomplete emptying, urinary hesitancy/intermittency, dysuria, hematuria and nocturia. Pt does use pads for protection; 3 pads per day (PPD). Fluid intake: 2.5 bottles/day oz water/day; bladder irritants include: none. Only other fluid during the day is seltzer water. Pt does not limit fluid intake due to bladder control, unless at night. Bowel: Frequency daily, mirolax. Ostomy bag    Pelvic Organ Prolapse/Pelvic Pain: Denies pelvic pain. Denies back pain. Denies hip pain    OB History: 4 vaginal deliveries. No complications with her deliveries.      Past Medical History/Comorbidities:   Ms. Carol Ramírez  has a past medical history of Arthritis, Autoimmune disease ([facility] [address] Psychiatric disorder, and Rheumatic fever. She also has no past medical history of Aneurysm ([facility] [address] Arrhythmia, Asthma, CAD (coronary artery disease), Cancer ([facility] [address] Chronic kidney disease, Chronic obstructive pulmonary disease ([facility] [address] Coagulation disorder ([facility] [address] Diabetes ([facility] [address] Difficult intubation, GERD (gastroesophageal reflux disease), Heart failure ([address] Hypertension, Liver disease, Malignant hyperthermia due to anesthesia, Morbid obesity ([facility] [address] Nausea & vomiting, Pseudocholinesterase deficiency, PUD (peptic ulcer disease), Seizures ([facility] [address] Stroke ([facility] [address] Thromboembolus ([facility] [address] Thyroid disease, or Unspecified sleep apnea. Ms. Keanu Pringle  has a past surgical history that includes hx hysterectomy; hx tonsillectomy; hx adenoidectomy; hx breast biopsy (Left); hx colostomy; pr abdomen surgery proc unlisted; hx knee arthroscopy (Left, 2013); hx orthopaedic (Left); and hx orthopaedic (Right). Social History/Living Environment:    Lives with . 3 story home. Difficulty going down stairs. Slowly ascends stairs. Prior Level of Function/Work/Activity:  Limited in ability to transition sit-to-stand without use of arm-rests, descend stairs, and transition in and out of bed without assistance. Previous Treatment Approaches:          No previous physical therapy for balance or incontinence, per patient. Personal Factors: Other factors that influence how disability is experienced by the patient:  Medical history - multiple surgical procedures   Ambulatory/Rehab Services H2 Model Falls Risk Assessment   Risk Factors:       (1)  Dizziness/Vertigo       (5)  History of Recent Falls [w/in 3 months] Ability to Rise from Chair:       (1)  Pushes up, successful in one attempt   Falls Prevention Plan:       Physical Limitations to Exercise (specify):  colostomy bag present   Total: (5 or greater = High Risk): 2   ©2010 [facility] [employer] Stone. [facility] Patent #[id number]. Federal Law prohibits the replication, distribution or use without written permission from [facility] of [address] [place] [address]   Current Medications:       Current Outpatient Medications:     lisinopril (PRINIVIL, ZESTRIL) 10 mg tablet, Take 10 mg by mouth daily. , Disp: , Rfl:     clopidogrel (PLAVIX) 75 mg tab, Take 75 mg by mouth nightly., Disp: , Rfl:     multivitamin (ONE A DAY) tablet, Take 1 Tab by mouth daily. , Disp: , Rfl:     Ca/D3/mag ox/zinc//katy/bor (CALCIUM 600-D3 PLUS PO), Take  by mouth daily. , Disp: , Rfl:     aspirin 81 mg chewable tablet, Take 81 mg by mouth daily. , Disp: , Rfl:     HYDROCODONE/ACETAMINOPHEN (NORCO PO), Take  by mouth., Disp: , Rfl:     colestipol (COLESTID) 1 gram tablet, Take 1 g by mouth two (2) times a day., Disp: , Rfl:     Venlafaxine 150 mg tr24, Take 1 Tab by mouth daily. , Disp: , Rfl:     divalproex DR (DEPAKOTE) 500 mg tablet, Take 500 mg by mouth nightly. Per anesthesia protocol:instructed to take am of surgery. , Disp: , Rfl:     ziprasidone (GEODON) 20 mg capsule, Take 20 mg by mouth three (3) times daily. Takes 2 capsules at bedtime per pt. Indications: MIXED BIPOLAR I DISORDER, Disp: , Rfl:     zolpidem (AMBIEN) 10 mg tablet, Take 10 mg by mouth nightly as needed. , Disp: , Rfl:    Date Last Reviewed: 2/26/2020   Number of Personal Factors/Comorbidities that affect the Plan of Care: 1-2: MODERATE COMPLEXITY   EXAMINATION:   External Observations:   Voluntary contraction: [x] absent     [] present   Involuntary contraction: [x] absent     [] present   Involuntary relaxation: [x] absent     [] present   Perineal descent at rest: [x] absent     [] present   Perineal descent with bearing: [x] absent     [] present   Skin integrity: Erythematous   Sit to stand transition: Upper extremity support required   Supine to sit transition: Required assistance   Gait pattern: Slow shuffled, no use of assistive device.      Pelvic Floor Muscle (PFM) Assessment:   Vaginal vault size: [x] decreased     [] increased     [] WNL   Muscle volume: [] decreased     [] WNL     [x] Defect   PFM tone: [] decreased     [x] increased     [] WNL    Contraction ability:  Voluntary contraction: [] absent     [x] weak     [] moderate      [] strong  Voluntary relaxation: [] absent     [x] partial     [] complete   MMT: 1/5  PFM endurance: DNT due to inability to weakness/tension. Number of quick contractions in 10 seconds: none    Accessory Pelvic Floor Muscle Assessment:                Transverse abdominis/Pelvic Floor co-contraction: Absent                Adductors: Reduced length bilaterally, non-tender                Abdominal wall: non-tender, poor coordination of TA                 Diaphragm/pelvic floor: Uncoordinated      Palpation: via vaginal canal  Superficial Pelvic Floor Musculature (PFM): Tender? Intermediate PFM Tender? Deep PFM Tender? R Superficial Transverse Perineal Yes R Deep Transverse Perineal Not tested R. Puborectalis Yes   L Superficial Transverse Perineal Yes L Deep Transverse Perineal No L. Puborectalis Yes   R Ischiocavernosus No R Compressor Urethra Not tested R. Pubococcygeus Yes   L Ischiocavernosus No L Compressor Urethra Not tested L. Pubococcygeus Yes   R Bulbocavernosus No   R. Ileococcygeus Yes   L Bulbocavernosus No   L. Ileococcygeus Yes       R. Obturator Internus Not tested       L. Obturator Internus Not tested       R. Coccygeus Yes       L. Coccygeus Yes          Body Structures Involved:  1. Nerves  2. Digestive Structures  3. Joints  4. Muscles Body Functions Affected:  1. Sensory/Pain  2. Genitourinary  3. Neuromusculoskeletal  4. Movement Related  5. Digestive  6. Metobolic/Endocrine Activities and Participation Affected:  1. Mobility  2. Self Care  3. Domestic Life  4. Interpersonal Interactions and Relationships  5.  Community, Social and [place]   Number of elements (examined above) that affect the Plan of Care: 4+: HIGH COMPLEXITY   CLINICAL PRESENTATION:   Presentation: Evolving clinical presentation with unstable and unpredictable characteristics: HIGH COMPLEXITY   CLINICAL DECISION MAKING:   Use of outcome tool(s) and clinical judgement create a POC that gives a: Difficult prediction of patient's progress: HIGH COMPLEXITY

## 2020-02-26 NOTE — PROGRESS NOTES
Mathew Hurd  : 1945  [place]  Secondary: [facility] at [facility]  [place] [address], [address], [address]  [phone]   [phone]      OUTPATIENT PHYSICAL THERAPY: Daily Treatment Note 2020   Visit Count:  Visit count could not be calculated. Make sure you are using a visit which is associated with an episode. N39.41 Urge incontinence, N39.3 Stress Urinary Incontinence, R27.8 Lack of coordination (muscle incoordination), R39.89 Functional Urinary Incontinence, M62.81 Generalized weakness,    Pre-treatment Symptoms/Complaints:  UUI, UI, Core weakness, Poor balance  Pain: Initial:   3/10 Post Session:  3/10   Medications Last Reviewed:  2020  Updated Objective Findings:  See evaluation note from today   TREATMENT:   THERAPEUTIC EXERCISE: (20 minutes):  Exercises per grid below to improve strength, coordination and dynamic movement of pelvic floor to improve functional bending and transitions. Required maximal visual, verbal and tactile cues to promote proper body mechanics. Progressed repetitions as indicated. SELF CARE: (10 minutes): Procedure(s) (per grid) utilized to improve and/or restore self-care/home management as related to toileting. Required maximal verbal and   cueing to facilitate activities of daily living skills. TE= therapeutic exercise, TA= therapeutic activity, MT= manual therapy, NE= neuro re-education   Date:   Date:   Date:     Activity/Exercise Parameters Parameters Parameters   Pt Edu: Role of PFM in support, stability, sphincteric function. POC Reviewed. HEP provided. 10 minute     Diaphragmatic Breathing for pelvic floor range of motion 5 minutes     Urge suppression 5 minutes                               Pt gives verbal consent to internal vaginal assessment/treatment without chaperon present.     MANUAL THERAPY: (  minutes):  to reduce tone and improve tissue elasticity (Used abbreviations: MET - muscle energy technique; SCS- Strain counter strain; CTM- Connective tissue mobilizations; CR- Contract/relax; SP- Sustained pressure, TrP- Trigger point release, IASTM- Instrument assisted soft tissue mobilizations, TDN- Trigger point dry needling, DB - diaphragmatic breathing, PFM - pelvic floor muscles, OI - obturator internus, IC - iliococcygeus, PC - pubococcygeus, DTP - deep transverse perineum. Treatment/Session Summary:    · Response to Treatment:  Pt reports good understanding of plan of care, as well as prescribed home exercise program.  All questions were answered to pt's satisfaction. Pt was invited to call with any further questions or concerns. · Communication/Consultation:  HEP provided  · Equipment provided today:  none  · Recommendations/Intent for next treatment session: Next visit will focus on:  1. Administer PFDI, Perez Balance outcome  2. Review diaphragmatic breathing, initiate general mobility program  3. Review urge suppression  4. Bladder diary - timed voiding schedule   Total Treatment Billable Duration: Evaluation 30 minutes, treatment 30 minutes     Lia Gutiérrez PT    No future appointments.

## 2020-03-12 ENCOUNTER — HOSPITAL ENCOUNTER (OUTPATIENT)
Dept: PHYSICAL THERAPY | Age: 75
Discharge: HOME | End: 2020-03-12
Payer: MEDICARE

## 2020-03-12 PROCEDURE — 97530 THERAPEUTIC ACTIVITIES: CPT

## 2020-03-12 PROCEDURE — 97112 NEUROMUSCULAR REEDUCATION: CPT

## 2020-03-12 NOTE — PROGRESS NOTES
Lyndon Pedro  : 1945  [place]  Secondary: [address] [facility] [place] [address] [facility], [address], [place] [address]  [phone]   [phone]      OUTPATIENT PHYSICAL THERAPY: Daily Treatment Note 3/12/2020   Visit Count:  2  N39.41 Urge incontinence, N39.3 Stress Urinary Incontinence, R27.8 Lack of coordination (muscle incoordination), R39.89 Functional Urinary Incontinence, M62.81 Generalized weakness,    Pre-treatment Symptoms/Complaints:  UUI, UI, Core weakness, Poor balance  Pt states no changes with urinary function in last week. No better or worse. Practicing exercise as prescribed. No falls reported since  Previous visit    Pain: Initial:   0/10 Post Session:  0/10   Medications Last Reviewed:  3/12/2020  Updated Objective Findings:       TREATMENT:   THERAPEUTIC EXERCISE: (15 minutes):  Exercises per grid below to improve strength, coordination and dynamic movement of pelvic floor to improve functional bending and transitions. Required maximal visual, verbal and tactile cues to promote proper body mechanics. Progressed repetitions as indicated. SELF CARE: (0 minutes): Procedure(s) (per grid) utilized to improve and/or restore self-care/home management as related to toileting. Required maximal verbal and   cueing to facilitate activities of daily living skills. TE= therapeutic exercise, TA= therapeutic activity, MT= manual therapy, NE= neuro re-education   Date:   Date:  3/12 Date:     Activity/Exercise Parameters Parameters Parameters   Pt Edu: Role of PFM in support, stability, sphincteric function. POC Reviewed. HEP provided. 10 minute 5 minutes    Diaphragmatic Breathing for pelvic floor range of motion 5 minutes 5 minutes    Urge suppression 5 minutes 5 minutes                                THERAPEUTIC ACTIVITY (10 minutes): Sit to stand with PF contraction and prior to transitions supine to sit. Access Code: [email or web address]   URL: [email or web address]   Date: 03/12/2020   Prepared by: Alem Malik     Exercises   Seated Pelvic Floor Contraction   Sit to Stand with Pelvic Floor Contraction     Pt gives verbal consent to internal vaginal assessment/treatment without chaperon present. MANUAL THERAPY: (0  minutes):  to reduce tone and improve tissue elasticity   (Used abbreviations: MET - muscle energy technique; SCS- Strain counter strain; CTM- Connective tissue mobilizations; CR- Contract/relax; SP- Sustained pressure, TrP- Trigger point release, IASTM- Instrument assisted soft tissue mobilizations, TDN- Trigger point dry needling, DB - diaphragmatic breathing, PFM - pelvic floor muscles, OI - obturator internus, IC - iliococcygeus, PC - pubococcygeus, DTP - deep transverse perineum. NEUROMUSCULAR RE-EDUCATION (30 minutes):  SEMG evaluation:  Date: 3/12  Resting Tone: <2 mV  Quality of Resting Tone: irregular  5 Second Hold: unable to complete uV  10 Second Hold: unable to complete uV  Quality of Recruitment: Fair   Quality of Relaxation: Good   Quality of Holding: Poor   Stability of Hold: Poor   Stability of Rest: Good       Treatment/Session Summary:    · Response to Treatment:  Pt exhibited frequent breath holding and poor ability to recruit TA and PF alone. sEMG biofeedback assisted in increasing awareness of PF contraction. Abdominal recruitment very inconsistent. · Communication/Consultation:  HEP provided  · Equipment provided today:  none  · Recommendations/Intent for next treatment session: Next visit will focus on:  1. Administer PFDI, Perez Balance outcome  2. Review diaphragmatic breathing, initiate general mobility program  3. Review timed voiding!! Every 2-3 hours. May need to admin bladder diary  4. Generalized core strengthening  5.  Review transitions with breath and coordinating PF contraction    Total Treatment Billable Duration: Treatment minutes 55 minutes     Alem Malik, PT    Future Appointments   Date Time Provider Aissatou Shanae   3/12/2020  3:30 PM [place] Bulla, Leilani Jeffers [facility]   3/17/2020  1:00 PM Calvin Bulla, PT [facility]   3/19/2020  3:30 PM Calvin Bulla, PT [facility]   3/24/2020  1:00 PM Calvin Bulla, PT [facility]   3/26/2020  3:30 PM Calvin Bulla, PT [facility]   3/31/2020  1:00 PM [place] Bulla, PT [facility]   4/2/2020  2:00 PM [place] Bulla, PT [facility]

## 2020-03-17 ENCOUNTER — APPOINTMENT (OUTPATIENT)
Dept: PHYSICAL THERAPY | Age: 75
End: 2020-03-17
Payer: MEDICARE

## 2020-03-26 ENCOUNTER — APPOINTMENT (OUTPATIENT)
Dept: PHYSICAL THERAPY | Age: 75
End: 2020-03-26
Payer: MEDICARE

## 2020-03-31 ENCOUNTER — APPOINTMENT (OUTPATIENT)
Dept: PHYSICAL THERAPY | Age: 75
End: 2020-03-31
Payer: MEDICARE

## 2020-04-02 ENCOUNTER — APPOINTMENT (OUTPATIENT)
Dept: PHYSICAL THERAPY | Age: 75
End: 2020-04-02

## 2020-07-22 NOTE — THERAPY DISCHARGE
Solange Wolfe  : 1945  [place]  Secondary: [address] [facility]  [address] [facility], [address] [place] [address], [address]  [phone]   [phone]         OUTPATIENT PHYSICAL THERAPY:Discharge Summary 3/12/2020   ICD-10: Treatment Diagnosis: N39.41 Urge incontinence, N39.3 Stress Urinary Incontinence, R27.8 Lack of coordination (muscle incoordination), R39.89 Functional Urinary Incontinence, M62.81 Generalized weakness,     Precautions/Allergies:   Lactose   TREATMENT PLAN:  Effective Dates: 2020 TO 2020 (90 days). Frequency/Duration: 2 times a week for 90 Day(s) MEDICAL/REFERRING DIAGNOSIS:  Stress incontinence (female) (male) [N39.3]   DATE OF ONSET:   REFERRING PHYSICIAN: Karl Mendoza MD Orders: Betty Zuniga and Treat for UI  Return MD Appointment: Unknown   DISCHARGE SUMMARY: Ms. Alicia Olmedo plan of care was suspended during Covid 19. Therapist reached out to patient regarding return to physical therapy with clinic re-opening. Pt uncomfortable with continuing at time of opening. INITIAL ASSESSMENT:  Ms. Alicia Olmedo presents with pelvic floor muscle weakness and abdominal weakness likely secondary to increased tension in her pelvic floor and abdominal muscles. She demonstrates difficulty coordinating a pelvic floor contraction without compensatory use of her adductors, gluteals, and breath holding. She also demonstrates poor coordination between her diaphragm and pelvic floor to lengthen her muscles correctly. Lastly, she demonstrates weakness through her transverse abdominis. These limitations are impairing her ability to transition sit-to-stand without UI, transition supine to sit, cough/laugh/sneeze without UI, and control urinary urges. PROBLEM LIST (Impacting functional limitations):  1. Decreased Strength  2. Decreased ADL/Functional Activities  3. Decreased Transfer Abilities  4.  Decreased Balance  5. Increased Pain  6. Decreased Activity Tolerance  7. Decreased Flexibility/Joint Mobility  8. Decreased [place] with Home Exercise Program  9. Decreased strength of pelvic floor which limits bladder contro INTERVENTIONS PLANNED: (Treatment may consist of any combination of the following)  1. Neuromuscular re-education  2. Biofeedback as needed  3. HEP  4. Bladder retraining  5. Bladder education  6. Balance Exercise  7. Bed Mobility  8. Home Exercise Program (HEP)  9. Manual Therapy  10. Neuromuscular Re-education/Strengthening  11. Range of Motion (ROM)  12. Therapeutic Activites  13. Therapeutic Exercise/Strengthening     GOALS: (Goals have been discussed and agreed upon with patient.)   Short Term Goals: 4 weeks  1. Pt will verbalize understanding of the pelvic floor muscle functions and demonstrate ability to properly coordinate a pelvic floor contraction with her diaphragm and transverse abdominis. 2. Pt will be independent in a home exercise program, promoting coordination of the pelvic floor and generalized LE strengthening. 3. Pt will be independent in timed voiding. Long Term Goals: 8 weeks  1. Pt will transition sit-to-stand without report of UI >50% of the time. 2. Pt will be independent in performing the knack to reduce episodes of ADELINE as measured by pad usage  By 50% (1-2 pads/day). 3. Pt will report no falls. OUTCOME MEASURE:   Tool Used: Pelvic Floor Disability Index (PFDI-20)  Score:  Initial: Measure on follow-up Most Recent: X (Date: -- )   Interpretation of Score: This survey asks questions concerning certain  bowel, bladder, or pelvic symptoms and how much this symptoms interfere with daily activiites. Each section is scored on a 0-4 scale, 4 representing the greatest disability. The scores of each section are added together for a total score out of 70.            EXAMINATION:   External Observations:   Voluntary contraction: [x] absent     [] present   Involuntary contraction: [x] absent     [] present   Involuntary relaxation: [x] absent     [] present   Perineal descent at rest: [x] absent     [] present   Perineal descent with bearing: [x] absent     [] present   Skin integrity: Erythematous   Sit to stand transition: Upper extremity support required   Supine to sit transition: Required assistance   Gait pattern: Slow shuffled, no use of assistive device. Pelvic Floor Muscle (PFM) Assessment:   Vaginal vault size: [x] decreased     [] increased     [] WNL   Muscle volume: [] decreased     [] WNL     [x] Defect   PFM tone: [] decreased     [x] increased     [] WNL    Contraction ability:  Voluntary contraction: [] absent     [x] weak     [] moderate      [] strong  Voluntary relaxation: [] absent     [x] partial     [] complete   MMT: 1/5  PFM endurance: DNT due to inability to weakness/tension. Number of quick contractions in 10 seconds: none    Accessory Pelvic Floor Muscle Assessment:                Transverse abdominis/Pelvic Floor co-contraction: Absent                Adductors: Reduced length bilaterally, non-tender                Abdominal wall: non-tender, poor coordination of TA                 Diaphragm/pelvic floor: Uncoordinated      Palpation: via vaginal canal  Superficial Pelvic Floor Musculature (PFM): Tender? Intermediate PFM Tender? Deep PFM Tender? R Superficial Transverse Perineal Yes R Deep Transverse Perineal Not tested R. Puborectalis Yes   L Superficial Transverse Perineal Yes L Deep Transverse Perineal No L. Puborectalis Yes   R Ischiocavernosus No R Compressor Urethra Not tested R. Pubococcygeus Yes   L Ischiocavernosus No L Compressor Urethra Not tested L. Pubococcygeus Yes   R Bulbocavernosus No   R. Ileococcygeus Yes   L Bulbocavernosus No   L. Ileococcygeus Yes       R. Obturator Internus Not tested       L. Obturator Internus Not tested       R. Coccygeus Yes       L.  Coccygeus Yes

## 2022-02-25 NOTE — PROGRESS NOTES
[facility] at [address]   [address] [place] [address], [address]  [phone]   [phone]    OUTPATIENT DAILY NOTE    NAME/AGE/GENDER: Jacinto Merlos is a 74 y.o. female. DATE: 6/26/2018    Patient showed  for appointment today, but began having some GI issues and had to cancel. Will plan to follow up on next scheduled visit.     Rambo Das, PT
Yes

## (undated) DEVICE — (D)STRIP SKN CLSR 0.5X4IN WHT --

## (undated) DEVICE — SOLUTION IRRIGATION BAL SALT SOLUTION 500 ML BTL 6/CA BSS +

## (undated) DEVICE — SURGICAL PROCEDURE PACK EYE CDS

## (undated) DEVICE — NEEDLE HYPO 25GA L5/8IN ORNG HUB S STL LATCH BVL UP

## (undated) DEVICE — BETADINE 5% EYE SOL

## (undated) DEVICE — SOLUTION IRRIGATION BAL SALT SOLUTION 500 ML STRL BSS

## (undated) DEVICE — SYR 5ML 1/5 GRAD LL NSAF LF --

## (undated) DEVICE — Device

## (undated) DEVICE — REVOLUTION DSP 23G ILM FORCEPS: Brand: ALCON GRIESHABER REVOLUTION

## (undated) DEVICE — SOLUTION IV STRL H2O 500 ML AQUALITE POUR BTL

## (undated) DEVICE — PAD,EYE,1-5/8X2 5/8,STERILE,LF,1/PK: Brand: MEDLINE

## (undated) DEVICE — NEEDLE HYPO 27GA L1.25IN GRY POLYPR HUB S STL REG BVL STR

## (undated) DEVICE — CANNULA OPHTH 2 BOR 25 GA 0.5 MM SS

## (undated) DEVICE — IRRIGATION KT OPHTH 80IN --

## (undated) DEVICE — Device: Brand: MILLEX-OR

## (undated) DEVICE — ADVANCED DSP BACKFLUSH BLUNT, 23G: Brand: ALCON GRIESHABER

## (undated) DEVICE — CONSTELLATION VISION SYSTEM 5000 CPM ULTRAVIT PROBE STRAIGHT ENDOILLUMINATOR 25+ TOTALPLUS VITRECTOMY PAK EDGEPLUS BLADE VALVED ENTRY SYSTEM: Brand: CONSTELLATION, ULTRAVIT, 25+, TOTALPLUS, EDGEPLUS

## (undated) DEVICE — EYE SPEAR / FINE DISSECTOR: Brand: DEROYAL

## (undated) DEVICE — 3M™ TEGADERM™ TRANSPARENT FILM DRESSING FRAME STYLE, 1626W, 4 IN X 4-3/4 IN (10 CM X 12 CM), 50/CT 4CT/CASE: Brand: 3M™ TEGADERM™

## (undated) DEVICE — Z DISCONTINUED NO SUB IDED SHIELD EYE PLAS RT BGE M 7.5CMX5.7CM VISITEC

## (undated) DEVICE — COVER,MAYO STAND,STERILE: Brand: MEDLINE